# Patient Record
Sex: FEMALE | Race: OTHER | NOT HISPANIC OR LATINO | URBAN - METROPOLITAN AREA
[De-identification: names, ages, dates, MRNs, and addresses within clinical notes are randomized per-mention and may not be internally consistent; named-entity substitution may affect disease eponyms.]

---

## 2017-08-09 ENCOUNTER — OUTPATIENT (OUTPATIENT)
Dept: OUTPATIENT SERVICES | Facility: HOSPITAL | Age: 77
LOS: 1 days | End: 2017-08-09
Payer: MEDICARE

## 2017-08-09 VITALS
HEIGHT: 62 IN | WEIGHT: 98.11 LBS | SYSTOLIC BLOOD PRESSURE: 161 MMHG | HEART RATE: 67 BPM | TEMPERATURE: 98 F | OXYGEN SATURATION: 100 % | RESPIRATION RATE: 16 BRPM | DIASTOLIC BLOOD PRESSURE: 64 MMHG

## 2017-08-09 DIAGNOSIS — I10 ESSENTIAL (PRIMARY) HYPERTENSION: ICD-10-CM

## 2017-08-09 DIAGNOSIS — E03.9 HYPOTHYROIDISM, UNSPECIFIED: ICD-10-CM

## 2017-08-09 DIAGNOSIS — M17.11 UNILATERAL PRIMARY OSTEOARTHRITIS, RIGHT KNEE: ICD-10-CM

## 2017-08-09 DIAGNOSIS — E46 UNSPECIFIED PROTEIN-CALORIE MALNUTRITION: ICD-10-CM

## 2017-08-09 DIAGNOSIS — Z01.818 ENCOUNTER FOR OTHER PREPROCEDURAL EXAMINATION: ICD-10-CM

## 2017-08-09 PROCEDURE — 86900 BLOOD TYPING SEROLOGIC ABO: CPT

## 2017-08-09 PROCEDURE — 86850 RBC ANTIBODY SCREEN: CPT

## 2017-08-09 PROCEDURE — 87641 MR-STAPH DNA AMP PROBE: CPT

## 2017-08-09 PROCEDURE — 86901 BLOOD TYPING SEROLOGIC RH(D): CPT

## 2017-08-09 PROCEDURE — G0463: CPT

## 2017-08-09 PROCEDURE — 87640 STAPH A DNA AMP PROBE: CPT

## 2017-08-09 NOTE — H&P PST ADULT - ATTENDING COMMENTS
Right knee severe tricompartmental arthritis, failed nonsurgical management for a long time.  She has severe pain and disability from knee arthritis and comes today for right total knee replacement.  We went over the surgery, goals, implants, approaches, risks, benefits and alternatives again today and she agrees to proceed. All of their many questions were answered.

## 2017-08-09 NOTE — H&P PST ADULT - NEGATIVE CARDIOVASCULAR SYMPTOMS
no paroxysmal nocturnal dyspnea/no chest pain/no dyspnea on exertion/no peripheral edema/no palpitations/no claudication

## 2017-08-09 NOTE — H&P PST ADULT - CONSTITUTIONAL DETAILS
well-nourished/well-groomed/no distress/well-developed well-groomed/undernourished, BMI 17.9/well-developed/no distress

## 2017-08-09 NOTE — H&P PST ADULT - PROBLEM SELECTOR PLAN 2
Still grieving loss of sole support 3mths ago. Case discussed with Supervisor of  Social Work. [facility] dept will contact patient by phone tomorrow morning

## 2017-08-09 NOTE — H&P PST ADULT - PROBLEM SELECTOR PLAN 3
Instructed to take antihypertensive medication the morning of surgery with a sip of water and to follow with PCP for management of hypertension and other comorbid conditions

## 2017-08-09 NOTE — H&P PST ADULT - HISTORY OF PRESENT ILLNESS
77 y/o female with PMH of      is here today for presurgical evaluation prior to surgery. She complains of severe right knee pain with ambulation and is scheduled for right total knee replacement on 8/23/2017 75 y/o female with PMH of hypertension, hypothyroidism, osteoporosis, hyperlipidemia, and osteoarthritis is here today for presurgical evaluation prior to surgery. She complains of severe right knee pain with ambulation and is scheduled for right total knee replacement on 8/23/2017

## 2017-08-09 NOTE — H&P PST ADULT - PROBLEM SELECTOR PLAN 1
Scheduled for right total knee replacement on 8/23/2017. Preoperative instructions discussed and given to patient and neighbor. Verbalized understanding of same

## 2017-08-09 NOTE — H&P PST ADULT - NEGATIVE GENERAL SYMPTOMS
no weight loss/no weight gain/no polyphagia/no polyuria/no polydipsia/no fatigue/no malaise/no chills/no sweating/no anorexia/no fever

## 2017-08-09 NOTE — H&P PST ADULT - MUSCULOSKELETAL
details… detailed exam decreased ROM due to pain/diminished strength/wearing right knee support sleeve joint swelling/decreased ROM due to pain/wearing right knee support sleeve/diminished strength

## 2017-08-09 NOTE — H&P PST ADULT - ACTIVITY
Limited exercised activity due to severe right knee pain, minimal ADLs, walks 1-2 blocks, avoids stairs

## 2017-08-09 NOTE — H&P PST ADULT - NSANTHOSAYNRD_GEN_A_CORE
No. ALETA screening performed.  STOP BANG Legend: 0-2 = LOW Risk; 3-4 = INTERMEDIATE Risk; 5-8 = HIGH Risk

## 2017-08-09 NOTE — H&P PST ADULT - ASSESSMENT
Pleasant 75 y/o frail-appearing undernourished  female with PMH of hypertension, hypothyroidism, osteoporosis, hyperlipidemia, and primary osteoarthritis of knee is scheduled for right total knee replacement on 8/23/2017. Patient is at  moderate risk for planned procedure

## 2017-08-09 NOTE — H&P PST ADULT - NEGATIVE GASTROINTESTINAL SYMPTOMS
no vomiting/no abdominal pain/no nausea/no diarrhea/no constipation/no change in bowel habits/no flatulence

## 2017-08-09 NOTE — H&P PST ADULT - RS GEN PE MLT RESP DETAILS PC
no rhonchi/no wheezes/good air movement/clear to auscultation bilaterally/airway patent/no chest wall tenderness/no intercostal retractions/no rales/respirations non-labored/no subcutaneous emphysema/normal/breath sounds equal no rhonchi/no rales/clear to auscultation bilaterally/airway patent/no chest wall tenderness/no intercostal retractions/no wheezes/respirations non-labored/no subcutaneous emphysema/good air movement/breath sounds equal

## 2017-08-09 NOTE — H&P PST ADULT - NEGATIVE ENMT SYMPTOMS
no nasal congestion/no vertigo/no sinus symptoms/no tinnitus/no ear pain/no nasal discharge/no nasal obstruction

## 2017-08-09 NOTE — H&P PST ADULT - PMH
Primary osteoarthritis of right knee Essential hypertension    Hyperlipidemia, unspecified hyperlipidemia type    Osteoporosis, unspecified osteoporosis type, unspecified pathological fracture presence    Primary osteoarthritis of right knee

## 2017-08-10 LAB
MRSA PCR RESULT.: SIGNIFICANT CHANGE UP
S AUREUS DNA NOSE QL NAA+PROBE: SIGNIFICANT CHANGE UP

## 2017-08-22 RX ORDER — SODIUM CHLORIDE 9 MG/ML
3 INJECTION INTRAMUSCULAR; INTRAVENOUS; SUBCUTANEOUS EVERY 8 HOURS
Qty: 0 | Refills: 0 | Status: DISCONTINUED | OUTPATIENT
Start: 2017-08-23 | End: 2017-08-26

## 2017-08-22 RX ORDER — ACETAMINOPHEN 500 MG
975 TABLET ORAL ONCE
Qty: 0 | Refills: 0 | Status: COMPLETED | OUTPATIENT
Start: 2017-08-23 | End: 2017-08-23

## 2017-08-22 RX ORDER — CELECOXIB 200 MG/1
200 CAPSULE ORAL ONCE
Qty: 0 | Refills: 0 | Status: COMPLETED | OUTPATIENT
Start: 2017-08-23 | End: 2017-08-23

## 2017-08-23 ENCOUNTER — INPATIENT (INPATIENT)
Facility: HOSPITAL | Age: 77
LOS: 2 days | Discharge: NURSING, REHAB OR LONG-TERM CARE | DRG: 469 | End: 2017-08-26
Attending: ORTHOPAEDIC SURGERY | Admitting: ORTHOPAEDIC SURGERY
Payer: MEDICARE

## 2017-08-23 VITALS
TEMPERATURE: 98 F | HEART RATE: 67 BPM | HEIGHT: 62 IN | SYSTOLIC BLOOD PRESSURE: 158 MMHG | OXYGEN SATURATION: 100 % | RESPIRATION RATE: 14 BRPM | WEIGHT: 98.11 LBS | DIASTOLIC BLOOD PRESSURE: 49 MMHG

## 2017-08-23 DIAGNOSIS — M17.11 UNILATERAL PRIMARY OSTEOARTHRITIS, RIGHT KNEE: ICD-10-CM

## 2017-08-23 LAB
ANION GAP SERPL CALC-SCNC: 8 MMOL/L — SIGNIFICANT CHANGE UP (ref 5–17)
BUN SERPL-MCNC: 8 MG/DL — SIGNIFICANT CHANGE UP (ref 7–18)
CALCIUM SERPL-MCNC: 8.6 MG/DL — SIGNIFICANT CHANGE UP (ref 8.4–10.5)
CHLORIDE SERPL-SCNC: 108 MMOL/L — SIGNIFICANT CHANGE UP (ref 96–108)
CO2 SERPL-SCNC: 25 MMOL/L — SIGNIFICANT CHANGE UP (ref 22–31)
CREAT SERPL-MCNC: 0.58 MG/DL — SIGNIFICANT CHANGE UP (ref 0.5–1.3)
GLUCOSE SERPL-MCNC: 89 MG/DL — SIGNIFICANT CHANGE UP (ref 70–99)
HCT VFR BLD CALC: 32.9 % — LOW (ref 34.5–45)
HGB BLD-MCNC: 11.1 G/DL — LOW (ref 11.5–15.5)
MCHC RBC-ENTMCNC: 30.5 PG — SIGNIFICANT CHANGE UP (ref 27–34)
MCHC RBC-ENTMCNC: 33.7 GM/DL — SIGNIFICANT CHANGE UP (ref 32–36)
MCV RBC AUTO: 90.6 FL — SIGNIFICANT CHANGE UP (ref 80–100)
PLATELET # BLD AUTO: 244 K/UL — SIGNIFICANT CHANGE UP (ref 150–400)
POTASSIUM SERPL-MCNC: 3.8 MMOL/L — SIGNIFICANT CHANGE UP (ref 3.5–5.3)
POTASSIUM SERPL-SCNC: 3.8 MMOL/L — SIGNIFICANT CHANGE UP (ref 3.5–5.3)
RBC # BLD: 3.63 M/UL — LOW (ref 3.8–5.2)
RBC # FLD: 12.3 % — SIGNIFICANT CHANGE UP (ref 10.3–14.5)
SODIUM SERPL-SCNC: 141 MMOL/L — SIGNIFICANT CHANGE UP (ref 135–145)
WBC # BLD: 4.8 K/UL — SIGNIFICANT CHANGE UP (ref 3.8–10.5)
WBC # FLD AUTO: 4.8 K/UL — SIGNIFICANT CHANGE UP (ref 3.8–10.5)

## 2017-08-23 PROCEDURE — 73562 X-RAY EXAM OF KNEE 3: CPT | Mod: 26,RT

## 2017-08-23 RX ORDER — NALOXONE HYDROCHLORIDE 4 MG/.1ML
0.1 SPRAY NASAL
Qty: 0 | Refills: 0 | Status: DISCONTINUED | OUTPATIENT
Start: 2017-08-23 | End: 2017-08-26

## 2017-08-23 RX ORDER — CEFAZOLIN SODIUM 1 G
1000 VIAL (EA) INJECTION EVERY 8 HOURS
Qty: 0 | Refills: 0 | Status: COMPLETED | OUTPATIENT
Start: 2017-08-23 | End: 2017-08-23

## 2017-08-23 RX ORDER — ATORVASTATIN CALCIUM 80 MG/1
40 TABLET, FILM COATED ORAL AT BEDTIME
Qty: 0 | Refills: 0 | Status: DISCONTINUED | OUTPATIENT
Start: 2017-08-23 | End: 2017-08-26

## 2017-08-23 RX ORDER — FERROUS SULFATE 325(65) MG
325 TABLET ORAL
Qty: 0 | Refills: 0 | Status: DISCONTINUED | OUTPATIENT
Start: 2017-08-23 | End: 2017-08-26

## 2017-08-23 RX ORDER — FOLIC ACID 0.8 MG
1 TABLET ORAL DAILY
Qty: 0 | Refills: 0 | Status: DISCONTINUED | OUTPATIENT
Start: 2017-08-23 | End: 2017-08-26

## 2017-08-23 RX ORDER — DOCUSATE SODIUM 100 MG
100 CAPSULE ORAL
Qty: 0 | Refills: 0 | Status: DISCONTINUED | OUTPATIENT
Start: 2017-08-23 | End: 2017-08-23

## 2017-08-23 RX ORDER — SODIUM CHLORIDE 9 MG/ML
1000 INJECTION, SOLUTION INTRAVENOUS
Qty: 0 | Refills: 0 | Status: DISCONTINUED | OUTPATIENT
Start: 2017-08-23 | End: 2017-08-26

## 2017-08-23 RX ORDER — AMLODIPINE BESYLATE 2.5 MG/1
2.5 TABLET ORAL DAILY
Qty: 0 | Refills: 0 | Status: DISCONTINUED | OUTPATIENT
Start: 2017-08-23 | End: 2017-08-26

## 2017-08-23 RX ORDER — ENOXAPARIN SODIUM 100 MG/ML
30 INJECTION SUBCUTANEOUS EVERY 12 HOURS
Qty: 0 | Refills: 0 | Status: DISCONTINUED | OUTPATIENT
Start: 2017-08-23 | End: 2017-08-26

## 2017-08-23 RX ORDER — BUPIVACAINE 13.3 MG/ML
20 INJECTION, SUSPENSION, LIPOSOMAL INFILTRATION ONCE
Qty: 0 | Refills: 0 | Status: DISCONTINUED | OUTPATIENT
Start: 2017-08-23 | End: 2017-08-26

## 2017-08-23 RX ORDER — LEVOTHYROXINE SODIUM 125 MCG
50 TABLET ORAL DAILY
Qty: 0 | Refills: 0 | Status: DISCONTINUED | OUTPATIENT
Start: 2017-08-23 | End: 2017-08-26

## 2017-08-23 RX ORDER — ASPIRIN/CALCIUM CARB/MAGNESIUM 324 MG
81 TABLET ORAL DAILY
Qty: 0 | Refills: 0 | Status: DISCONTINUED | OUTPATIENT
Start: 2017-08-23 | End: 2017-08-26

## 2017-08-23 RX ORDER — DOCUSATE SODIUM 100 MG
100 CAPSULE ORAL THREE TIMES A DAY
Qty: 0 | Refills: 0 | Status: DISCONTINUED | OUTPATIENT
Start: 2017-08-23 | End: 2017-08-26

## 2017-08-23 RX ORDER — TRANEXAMIC ACID 100 MG/ML
1000 INJECTION, SOLUTION INTRAVENOUS
Qty: 0 | Refills: 0 | Status: DISCONTINUED | OUTPATIENT
Start: 2017-08-23 | End: 2017-08-23

## 2017-08-23 RX ORDER — TRANEXAMIC ACID 100 MG/ML
INJECTION, SOLUTION INTRAVENOUS
Qty: 0 | Refills: 0 | Status: DISCONTINUED | OUTPATIENT
Start: 2017-08-23 | End: 2017-08-23

## 2017-08-23 RX ORDER — ACETAMINOPHEN 500 MG
1000 TABLET ORAL ONCE
Qty: 0 | Refills: 0 | Status: DISCONTINUED | OUTPATIENT
Start: 2017-08-23 | End: 2017-08-23

## 2017-08-23 RX ORDER — ACETAMINOPHEN 500 MG
650 TABLET ORAL EVERY 6 HOURS
Qty: 0 | Refills: 0 | Status: DISCONTINUED | OUTPATIENT
Start: 2017-08-23 | End: 2017-08-26

## 2017-08-23 RX ORDER — SENNA PLUS 8.6 MG/1
2 TABLET ORAL AT BEDTIME
Qty: 0 | Refills: 0 | Status: DISCONTINUED | OUTPATIENT
Start: 2017-08-23 | End: 2017-08-26

## 2017-08-23 RX ORDER — ONDANSETRON 8 MG/1
4 TABLET, FILM COATED ORAL EVERY 6 HOURS
Qty: 0 | Refills: 0 | Status: DISCONTINUED | OUTPATIENT
Start: 2017-08-23 | End: 2017-08-26

## 2017-08-23 RX ORDER — HYDROMORPHONE HYDROCHLORIDE 2 MG/ML
0.5 INJECTION INTRAMUSCULAR; INTRAVENOUS; SUBCUTANEOUS ONCE
Qty: 0 | Refills: 0 | Status: DISCONTINUED | OUTPATIENT
Start: 2017-08-23 | End: 2017-08-23

## 2017-08-23 RX ORDER — TRANEXAMIC ACID 100 MG/ML
1000 INJECTION, SOLUTION INTRAVENOUS ONCE
Qty: 0 | Refills: 0 | Status: COMPLETED | OUTPATIENT
Start: 2017-08-23 | End: 2017-08-23

## 2017-08-23 RX ORDER — ASCORBIC ACID 60 MG
500 TABLET,CHEWABLE ORAL
Qty: 0 | Refills: 0 | Status: DISCONTINUED | OUTPATIENT
Start: 2017-08-23 | End: 2017-08-26

## 2017-08-23 RX ORDER — KETOROLAC TROMETHAMINE 30 MG/ML
15 SYRINGE (ML) INJECTION EVERY 8 HOURS
Qty: 0 | Refills: 0 | Status: DISCONTINUED | OUTPATIENT
Start: 2017-08-23 | End: 2017-08-26

## 2017-08-23 RX ORDER — TRANEXAMIC ACID 100 MG/ML
1000 INJECTION, SOLUTION INTRAVENOUS ONCE
Qty: 0 | Refills: 0 | Status: DISCONTINUED | OUTPATIENT
Start: 2017-08-23 | End: 2017-08-23

## 2017-08-23 RX ADMIN — Medication 100 MILLIGRAM(S): at 16:19

## 2017-08-23 RX ADMIN — SODIUM CHLORIDE 3 MILLILITER(S): 9 INJECTION INTRAMUSCULAR; INTRAVENOUS; SUBCUTANEOUS at 23:04

## 2017-08-23 RX ADMIN — Medication 100 MILLIGRAM(S): at 23:05

## 2017-08-23 RX ADMIN — TRANEXAMIC ACID 220 MILLIGRAM(S): 100 INJECTION, SOLUTION INTRAVENOUS at 11:06

## 2017-08-23 RX ADMIN — Medication 975 MILLIGRAM(S): at 06:40

## 2017-08-23 RX ADMIN — Medication 15 MILLIGRAM(S): at 16:19

## 2017-08-23 RX ADMIN — SODIUM CHLORIDE 3 MILLILITER(S): 9 INJECTION INTRAMUSCULAR; INTRAVENOUS; SUBCUTANEOUS at 06:50

## 2017-08-23 RX ADMIN — ENOXAPARIN SODIUM 30 MILLIGRAM(S): 100 INJECTION SUBCUTANEOUS at 23:05

## 2017-08-23 RX ADMIN — Medication 81 MILLIGRAM(S): at 16:20

## 2017-08-23 RX ADMIN — CELECOXIB 200 MILLIGRAM(S): 200 CAPSULE ORAL at 06:41

## 2017-08-23 RX ADMIN — ATORVASTATIN CALCIUM 40 MILLIGRAM(S): 80 TABLET, FILM COATED ORAL at 23:05

## 2017-08-23 RX ADMIN — Medication 15 MILLIGRAM(S): at 17:14

## 2017-08-23 RX ADMIN — Medication 650 MILLIGRAM(S): at 15:11

## 2017-08-23 RX ADMIN — Medication 325 MILLIGRAM(S): at 16:19

## 2017-08-23 RX ADMIN — Medication 500 MILLIGRAM(S): at 16:19

## 2017-08-23 NOTE — PHYSICAL THERAPY INITIAL EVALUATION ADULT - CRITERIA FOR SKILLED THERAPEUTIC INTERVENTIONS
predicted duration of therapy intervention/anticipated equipment needs at discharge/impairments found/rehab potential/anticipated discharge recommendation/therapy frequency

## 2017-08-23 NOTE — PROGRESS NOTE ADULT - SUBJECTIVE AND OBJECTIVE BOX
Ortho Note POD# 0  76yFemale    Diagnosis:  S/p Right Total Knee Replacement POD# 0    Patient is seen and evaluated at bedside; offers no acute complaints. Pain is mild 2/10; well controlled.  Has been seen by PT today for evaluation, and CPM machine. patient has voided post-op.    Vital Signs Last 24 Hrs  T(C): 36.4 (23 Aug 2017 12:51), Max: 36.9 (23 Aug 2017 06:34)  T(F): 97.6 (23 Aug 2017 12:51), Max: 98.4 (23 Aug 2017 06:34)  HR: 58 (23 Aug 2017 17:42) (54 - 69)  BP: 139/64 (23 Aug 2017 17:42) (102/61 - 170/67)  BP(mean): 93 (23 Aug 2017 12:30) (93 - 93)  RR: 17 (23 Aug 2017 12:51) (13 - 20)  SpO2: 99% (23 Aug 2017 17:42) (96% - 100%)  I&O's Detail    23 Aug 2017 07:01  -  23 Aug 2017 19:11  --------------------------------------------------------  IN:    dextrose 5% + sodium chloride 0.45%.: 150 mL    IV PiggyBack: 100 mL  Total IN: 250 mL            Physical Exam:    General: AAOx3, in NAD, resting comfortably in bed.    Right knee:  In nl. alignment. Dressing is C/D/I.  Calves are soft, non-tender. 2+pulses. NVI.                          11.1   4.8   )-----------( 244      ( 23 Aug 2017 10:14 )             32.9     08-23    141  |  108  |  8   ----------------------------<  89  3.8   |  25  |  0.58    Ca    8.6      23 Aug 2017 10:14        Impression:  76yFemale S/p Right total knee replacement POD# 0  Plan:  - continue IV fluids  -  Continue pain management with Toradol 15mg IV PRN Q8hrs for breakthrough pain  -  DVT prophylaxis with Lovenox 30mg SQ q12hrs and venodyne boots  -  Daily Physical Therapy:  WBAT on RLE with walker. CPM requested by Dr. Barron due to patients history of lack of knee flexion.  -  Discharge planning: Home Vs. Rehab pending Physical therapy eval. and patients progress.  -  Continue Antibiotics x 24hrs post-op (kefzol 1gm q8hrs [id number])  -  Encouraged use of incentive spirometer  -  Case d/w Dr. Barron

## 2017-08-23 NOTE — PHYSICAL THERAPY INITIAL EVALUATION ADULT - ACTIVE RANGE OF MOTION EXAMINATION, REHAB EVAL
bilateral upper extremity Active ROM was WFL (within functional limits)/Right ankle-WFL/Left LE Active ROM was WFL (within functional limits)

## 2017-08-23 NOTE — CHART NOTE - NSCHARTNOTEFT_GEN_A_CORE
BRIEF OPERATIVE NOTE:  DATE 8/23/17  DIAGNOSIS:  RIGHT KNEE SEVERE OSTEOARTHRITIS  ICD-10 - M17.11    PROCEDURE:  1. RIGHT COMPLEX TOTAL KNEE REPLACEMENT AND 2. COMPUTER-ASSISTED IMAGELESS NAVIGATION  CPT 26906-IF-66 AND 89372-TG  SURGEON: CLAUDETTE LAJAM, MD (ORTHOPEDIC SURGERY)  ASSISTANT:  SYLWIA EDDY PA-C  ANESTHESIA:  DR. VALVERDE/ SPINAL WITH INTRA-ARTICULAR MEDICATION  TRANEXAMIC ACID WAS GIVEN    TOURNIQUET TIME:  51 MINUTES  MM HG  IMPLANTS:  GONZALEZ AND NEPHEW LEGION  FEMUR SIZE 3, TIBIA SIZE 2, 11MM HIGH FLEX POLY, 29 MM PATELLA  ORTHALIGN NAVIGATION WAS USED    BLOOD LOSS:  APPROXIMATELY 150 CC  COMPLICATIONS: NONE KNOWN

## 2017-08-23 NOTE — PATIENT PROFILE ADULT. - PMH
Essential hypertension    Hyperlipidemia, unspecified hyperlipidemia type    Osteoporosis, unspecified osteoporosis type, unspecified pathological fracture presence    Primary osteoarthritis of right knee

## 2017-08-24 LAB
ANION GAP SERPL CALC-SCNC: 6 MMOL/L — SIGNIFICANT CHANGE UP (ref 5–17)
BUN SERPL-MCNC: 7 MG/DL — SIGNIFICANT CHANGE UP (ref 7–18)
CALCIUM SERPL-MCNC: 8.4 MG/DL — SIGNIFICANT CHANGE UP (ref 8.4–10.5)
CHLORIDE SERPL-SCNC: 102 MMOL/L — SIGNIFICANT CHANGE UP (ref 96–108)
CO2 SERPL-SCNC: 28 MMOL/L — SIGNIFICANT CHANGE UP (ref 22–31)
CREAT SERPL-MCNC: 0.57 MG/DL — SIGNIFICANT CHANGE UP (ref 0.5–1.3)
GLUCOSE SERPL-MCNC: 136 MG/DL — HIGH (ref 70–99)
HCT VFR BLD CALC: 33.3 % — LOW (ref 34.5–45)
HGB BLD-MCNC: 11 G/DL — LOW (ref 11.5–15.5)
MCHC RBC-ENTMCNC: 30 PG — SIGNIFICANT CHANGE UP (ref 27–34)
MCHC RBC-ENTMCNC: 33.2 GM/DL — SIGNIFICANT CHANGE UP (ref 32–36)
MCV RBC AUTO: 90.3 FL — SIGNIFICANT CHANGE UP (ref 80–100)
PLATELET # BLD AUTO: 278 K/UL — SIGNIFICANT CHANGE UP (ref 150–400)
POTASSIUM SERPL-MCNC: 3.4 MMOL/L — LOW (ref 3.5–5.3)
POTASSIUM SERPL-SCNC: 3.4 MMOL/L — LOW (ref 3.5–5.3)
RBC # BLD: 3.69 M/UL — LOW (ref 3.8–5.2)
RBC # FLD: 12 % — SIGNIFICANT CHANGE UP (ref 10.3–14.5)
SODIUM SERPL-SCNC: 136 MMOL/L — SIGNIFICANT CHANGE UP (ref 135–145)
WBC # BLD: 7.2 K/UL — SIGNIFICANT CHANGE UP (ref 3.8–10.5)
WBC # FLD AUTO: 7.2 K/UL — SIGNIFICANT CHANGE UP (ref 3.8–10.5)

## 2017-08-24 RX ORDER — OXYCODONE AND ACETAMINOPHEN 5; 325 MG/1; MG/1
1 TABLET ORAL EVERY 4 HOURS
Qty: 0 | Refills: 0 | Status: DISCONTINUED | OUTPATIENT
Start: 2017-08-24 | End: 2017-08-26

## 2017-08-24 RX ORDER — OXYCODONE AND ACETAMINOPHEN 5; 325 MG/1; MG/1
2 TABLET ORAL EVERY 6 HOURS
Qty: 0 | Refills: 0 | Status: DISCONTINUED | OUTPATIENT
Start: 2017-08-24 | End: 2017-08-26

## 2017-08-24 RX ORDER — POTASSIUM CHLORIDE 20 MEQ
40 PACKET (EA) ORAL ONCE
Qty: 0 | Refills: 0 | Status: COMPLETED | OUTPATIENT
Start: 2017-08-24 | End: 2017-08-24

## 2017-08-24 RX ADMIN — Medication 81 MILLIGRAM(S): at 12:20

## 2017-08-24 RX ADMIN — Medication 325 MILLIGRAM(S): at 12:19

## 2017-08-24 RX ADMIN — Medication 100 MILLIGRAM(S): at 21:25

## 2017-08-24 RX ADMIN — ATORVASTATIN CALCIUM 40 MILLIGRAM(S): 80 TABLET, FILM COATED ORAL at 21:25

## 2017-08-24 RX ADMIN — Medication 50 MICROGRAM(S): at 05:37

## 2017-08-24 RX ADMIN — OXYCODONE AND ACETAMINOPHEN 2 TABLET(S): 5; 325 TABLET ORAL at 21:26

## 2017-08-24 RX ADMIN — Medication 40 MILLIEQUIVALENT(S): at 12:19

## 2017-08-24 RX ADMIN — Medication 500 MILLIGRAM(S): at 05:37

## 2017-08-24 RX ADMIN — Medication 325 MILLIGRAM(S): at 07:45

## 2017-08-24 RX ADMIN — Medication 15 MILLIGRAM(S): at 05:37

## 2017-08-24 RX ADMIN — SODIUM CHLORIDE 3 MILLILITER(S): 9 INJECTION INTRAMUSCULAR; INTRAVENOUS; SUBCUTANEOUS at 12:18

## 2017-08-24 RX ADMIN — Medication 325 MILLIGRAM(S): at 18:04

## 2017-08-24 RX ADMIN — ENOXAPARIN SODIUM 30 MILLIGRAM(S): 100 INJECTION SUBCUTANEOUS at 12:19

## 2017-08-24 RX ADMIN — SODIUM CHLORIDE 3 MILLILITER(S): 9 INJECTION INTRAMUSCULAR; INTRAVENOUS; SUBCUTANEOUS at 21:19

## 2017-08-24 RX ADMIN — OXYCODONE AND ACETAMINOPHEN 1 TABLET(S): 5; 325 TABLET ORAL at 11:44

## 2017-08-24 RX ADMIN — Medication 1 TABLET(S): at 12:20

## 2017-08-24 RX ADMIN — SODIUM CHLORIDE 3 MILLILITER(S): 9 INJECTION INTRAMUSCULAR; INTRAVENOUS; SUBCUTANEOUS at 05:44

## 2017-08-24 RX ADMIN — Medication 1 MILLIGRAM(S): at 12:19

## 2017-08-24 RX ADMIN — AMLODIPINE BESYLATE 2.5 MILLIGRAM(S): 2.5 TABLET ORAL at 05:37

## 2017-08-24 RX ADMIN — Medication 500 MILLIGRAM(S): at 18:04

## 2017-08-24 RX ADMIN — ENOXAPARIN SODIUM 30 MILLIGRAM(S): 100 INJECTION SUBCUTANEOUS at 21:25

## 2017-08-24 RX ADMIN — Medication 100 MILLIGRAM(S): at 12:19

## 2017-08-24 RX ADMIN — Medication 15 MILLIGRAM(S): at 06:07

## 2017-08-24 RX ADMIN — OXYCODONE AND ACETAMINOPHEN 2 TABLET(S): 5; 325 TABLET ORAL at 22:33

## 2017-08-24 RX ADMIN — SENNA PLUS 2 TABLET(S): 8.6 TABLET ORAL at 07:45

## 2017-08-24 RX ADMIN — Medication 100 MILLIGRAM(S): at 05:37

## 2017-08-24 RX ADMIN — OXYCODONE AND ACETAMINOPHEN 1 TABLET(S): 5; 325 TABLET ORAL at 08:55

## 2017-08-24 NOTE — PROGRESS NOTE ADULT - SUBJECTIVE AND OBJECTIVE BOX
ATTENDING NOTE - POD #1 RIGHT TKA, COMPLEX    Patient awake, in bed.  Complains of knee pain. Otherwise feels "good."  She has been on CPM and states it felt OK.  Denies CP or SOB. + flatus.  Tolerating PO.  AFVSS    Right knee: Dressings CDI  EHL TA GS 5/5  SILT  Calves supple  pulses 2+  Abdomen soft and nontender    Labs - P    A/P  Right TKA yesterday.  Doing well  Complex knee, was immoble preoperatively with ROM 0-10 - must work on ROM  OOB, WBAT  DVT ppx  DC plan to [facility] Friday.   FU office in 3 weeks [phone] ATTENDING NOTE - POD #1 RIGHT TKA, COMPLEX    Patient awake, in bed.  Complains of knee pain. Otherwise feels "good."  She has been on CPM and states it felt OK.  Denies CP or SOB. + flatus.  Tolerating PO.  AFVSS    Right knee: Dressings CDI  EHL TA GS 5/5  SILT  Calves supple  pulses 2+  Abdomen soft and nontender                          11.0   x     )-----------( 278      ( 24 Aug 2017 07:46 )             33.3   08-24    136  |  102  |  7   ----------------------------<  136<H>  3.4<L>   |  28  |  0.57    Ca    8.4      24 Aug 2017 07:46        A/P  Right TKA yesterday.  Doing well  Complex knee, was immoble preoperatively with ROM 0-10 - must work on ROM  OOB, WBAT  DVT ppx  DC plan to [facility] Friday.   FU office in 3 weeks [phone]

## 2017-08-25 LAB
ANION GAP SERPL CALC-SCNC: 7 MMOL/L — SIGNIFICANT CHANGE UP (ref 5–17)
BUN SERPL-MCNC: 8 MG/DL — SIGNIFICANT CHANGE UP (ref 7–18)
CALCIUM SERPL-MCNC: 8.8 MG/DL — SIGNIFICANT CHANGE UP (ref 8.4–10.5)
CHLORIDE SERPL-SCNC: 102 MMOL/L — SIGNIFICANT CHANGE UP (ref 96–108)
CO2 SERPL-SCNC: 28 MMOL/L — SIGNIFICANT CHANGE UP (ref 22–31)
CREAT SERPL-MCNC: 0.62 MG/DL — SIGNIFICANT CHANGE UP (ref 0.5–1.3)
GLUCOSE SERPL-MCNC: 100 MG/DL — HIGH (ref 70–99)
HCT VFR BLD CALC: 34.7 % — SIGNIFICANT CHANGE UP (ref 34.5–45)
HGB BLD-MCNC: 11.5 G/DL — SIGNIFICANT CHANGE UP (ref 11.5–15.5)
MCHC RBC-ENTMCNC: 30 PG — SIGNIFICANT CHANGE UP (ref 27–34)
MCHC RBC-ENTMCNC: 33.1 GM/DL — SIGNIFICANT CHANGE UP (ref 32–36)
MCV RBC AUTO: 90.6 FL — SIGNIFICANT CHANGE UP (ref 80–100)
PLATELET # BLD AUTO: 277 K/UL — SIGNIFICANT CHANGE UP (ref 150–400)
POTASSIUM SERPL-MCNC: 3.8 MMOL/L — SIGNIFICANT CHANGE UP (ref 3.5–5.3)
POTASSIUM SERPL-SCNC: 3.8 MMOL/L — SIGNIFICANT CHANGE UP (ref 3.5–5.3)
RBC # BLD: 3.83 M/UL — SIGNIFICANT CHANGE UP (ref 3.8–5.2)
RBC # FLD: 11.9 % — SIGNIFICANT CHANGE UP (ref 10.3–14.5)
SODIUM SERPL-SCNC: 137 MMOL/L — SIGNIFICANT CHANGE UP (ref 135–145)
WBC # BLD: 8.4 K/UL — SIGNIFICANT CHANGE UP (ref 3.8–10.5)
WBC # FLD AUTO: 8.4 K/UL — SIGNIFICANT CHANGE UP (ref 3.8–10.5)

## 2017-08-25 RX ORDER — ACETAMINOPHEN 500 MG
1 TABLET ORAL
Qty: 0 | Refills: 0 | COMMUNITY

## 2017-08-25 RX ORDER — FOLIC ACID 0.8 MG
1 TABLET ORAL
Qty: 0 | Refills: 0 | DISCHARGE
Start: 2017-08-25

## 2017-08-25 RX ORDER — ASCORBIC ACID 60 MG
1 TABLET,CHEWABLE ORAL
Qty: 0 | Refills: 0 | DISCHARGE
Start: 2017-08-25

## 2017-08-25 RX ORDER — ENOXAPARIN SODIUM 100 MG/ML
30 INJECTION SUBCUTANEOUS
Qty: 0 | Refills: 0 | DISCHARGE
Start: 2017-08-25

## 2017-08-25 RX ADMIN — Medication 100 MILLIGRAM(S): at 05:09

## 2017-08-25 RX ADMIN — OXYCODONE AND ACETAMINOPHEN 2 TABLET(S): 5; 325 TABLET ORAL at 11:00

## 2017-08-25 RX ADMIN — Medication 1 TABLET(S): at 11:28

## 2017-08-25 RX ADMIN — SENNA PLUS 2 TABLET(S): 8.6 TABLET ORAL at 05:10

## 2017-08-25 RX ADMIN — ENOXAPARIN SODIUM 30 MILLIGRAM(S): 100 INJECTION SUBCUTANEOUS at 21:10

## 2017-08-25 RX ADMIN — ENOXAPARIN SODIUM 30 MILLIGRAM(S): 100 INJECTION SUBCUTANEOUS at 11:28

## 2017-08-25 RX ADMIN — Medication 325 MILLIGRAM(S): at 17:53

## 2017-08-25 RX ADMIN — SODIUM CHLORIDE 3 MILLILITER(S): 9 INJECTION INTRAMUSCULAR; INTRAVENOUS; SUBCUTANEOUS at 17:47

## 2017-08-25 RX ADMIN — OXYCODONE AND ACETAMINOPHEN 2 TABLET(S): 5; 325 TABLET ORAL at 21:13

## 2017-08-25 RX ADMIN — SODIUM CHLORIDE 3 MILLILITER(S): 9 INJECTION INTRAMUSCULAR; INTRAVENOUS; SUBCUTANEOUS at 22:00

## 2017-08-25 RX ADMIN — Medication 500 MILLIGRAM(S): at 05:09

## 2017-08-25 RX ADMIN — Medication 81 MILLIGRAM(S): at 11:28

## 2017-08-25 RX ADMIN — Medication 1 MILLIGRAM(S): at 11:28

## 2017-08-25 RX ADMIN — OXYCODONE AND ACETAMINOPHEN 2 TABLET(S): 5; 325 TABLET ORAL at 10:10

## 2017-08-25 RX ADMIN — SODIUM CHLORIDE 3 MILLILITER(S): 9 INJECTION INTRAMUSCULAR; INTRAVENOUS; SUBCUTANEOUS at 05:13

## 2017-08-25 RX ADMIN — Medication 325 MILLIGRAM(S): at 08:27

## 2017-08-25 RX ADMIN — Medication 100 MILLIGRAM(S): at 13:42

## 2017-08-25 RX ADMIN — ATORVASTATIN CALCIUM 40 MILLIGRAM(S): 80 TABLET, FILM COATED ORAL at 21:10

## 2017-08-25 RX ADMIN — OXYCODONE AND ACETAMINOPHEN 1 TABLET(S): 5; 325 TABLET ORAL at 06:42

## 2017-08-25 RX ADMIN — Medication 500 MILLIGRAM(S): at 17:53

## 2017-08-25 RX ADMIN — OXYCODONE AND ACETAMINOPHEN 2 TABLET(S): 5; 325 TABLET ORAL at 21:45

## 2017-08-25 RX ADMIN — Medication 100 MILLIGRAM(S): at 21:10

## 2017-08-25 RX ADMIN — OXYCODONE AND ACETAMINOPHEN 1 TABLET(S): 5; 325 TABLET ORAL at 05:15

## 2017-08-25 RX ADMIN — Medication 325 MILLIGRAM(S): at 13:42

## 2017-08-25 RX ADMIN — Medication 50 MICROGRAM(S): at 05:09

## 2017-08-25 RX ADMIN — AMLODIPINE BESYLATE 2.5 MILLIGRAM(S): 2.5 TABLET ORAL at 05:40

## 2017-08-25 NOTE — PROGRESS NOTE ADULT - SUBJECTIVE AND OBJECTIVE BOX
76yFemale    Diagnosis:  S/p R Total Knee Replacement POD#2    Patient was seen and evaluated at bedside. Patient with no acute complaints.   Pain is well controlled.  Denies CP/SOB, dyspnea, paresthesias, N/V/D, palpitations.     Vital Signs Last 24 Hrs  T(C): 36.4 (25 Aug 2017 05:10), Max: 36.6 (24 Aug 2017 21:15)  T(F): 97.6 (25 Aug 2017 05:10), Max: 97.8 (24 Aug 2017 21:15)  HR: 69 (25 Aug 2017 05:10) (65 - 72)  BP: 151/52 (25 Aug 2017 05:10) (132/58 - 151/52)  BP(mean): --  RR: 17 (25 Aug 2017 05:10) (17 - 18)  SpO2: 100% (25 Aug 2017 05:10) (98% - 100%)  I&O's Detail      Physical Exam:    General: AAOx3, NAD, resting comfortably in bed.    R KNEE:  Dressing is C/D/I. Skin is pink and warm. Staples intact. No erythema. SILT.  Wound with no drainage, healing well.   Lower extremity:  No calf tenderness, calves are soft. 2+pulses. NVI. 5/5 Strength of EHL/TA/gastrocnemius B/L.  Good capillary refill.                           11.5   8.4   )-----------( 277      ( 25 Aug 2017 06:25 )             34.7     08-25    137  |  102  |  8   ----------------------------<  100<H>  3.8   |  28  |  0.62    Ca    8.8      25 Aug 2017 06:25        Impression:  76yFemale S/p R Total Knee Replacement POD#2  Plan:  -  Pain management  -  Dvt prophylaxis with Lovenox/venodynes  -  Daily Physical Theapy:  WBAT   -  Continue with heel bump when laying in bed  -  Discharge planning: Home Vs. Rehab pending Physical therapy eval.  -  Dressing changed

## 2017-08-25 NOTE — DISCHARGE NOTE ADULT - CARE PLAN
Principal Discharge DX:	Primary osteoarthritis of right knee  Goal:	Right total knee replacement  Instructions for follow-up, activity and diet:	improve pain and ROM  Secondary Diagnosis:	Essential hypertension  Goal:	continue home meds  Secondary Diagnosis:	Hyperlipidemia, unspecified hyperlipidemia type  Goal:	continue home meds  Secondary Diagnosis:	Hypothyroidism, unspecified type  Goal:	continue home meds

## 2017-08-25 NOTE — DISCHARGE NOTE ADULT - MEDICATION SUMMARY - MEDICATIONS TO TAKE
I will START or STAY ON the medications listed below when I get home from the hospital:    fish oil  -- 1 tab(s) by mouth once a day, As Needed  -- Indication: For as before    aspirin 81 mg oral tablet  -- 1 tab(s) by mouth once a day  -- Indication: For as before    Percocet 5/325 oral tablet  -- 1 tab(s) by mouth every 4 hours, As Needed  -- Indication: For Pain    enoxaparin  -- 30 milligram(s) subcutaneous every 12 hours  -- Indication: For dvt prophylaxis    atorvastatin 40 mg oral tablet  -- 1 tab(s) by mouth once a day  -- Indication: For as before    alendronate weekly  -- 1 tab(s) by mouth once a week  -- Indication: For as before    amLODIPine 2.5 mg oral tablet  -- 1 tab(s) by mouth once a day  -- Indication: For as before    ferrous sulfate 325 mg (65 mg elemental iron) oral tablet  -- 1 tab(s) by mouth 2 times a day  -- Indication: For anemia    Colace 100 mg oral capsule  -- 1 cap(s) by mouth 2 times a day  -- Indication: For constipation    levothyroxine 50 mcg (0.05 mg) oral tablet  -- 1 tab(s) by mouth once a day  -- Indication: For as before    Calcium 600+D oral tablet  -- 1 tab(s) by mouth once a day  -- Indication: For as before    Centrum Silver oral tablet  -- 1 tab(s) by mouth once a day  -- Indication: For as before    ascorbic acid 500 mg oral tablet  -- 1 tab(s) by mouth 2 times a day  -- Indication: For supplement    folic acid 1 mg oral tablet  -- 1 tab(s) by mouth once a day  -- Indication: For supplement

## 2017-08-25 NOTE — DISCHARGE NOTE ADULT - ADDITIONAL INSTRUCTIONS
Pain Management- See Attached Medication Reconciliation    **StretchStrap Stretching exercises for Total knee replacement***  Weight Bearing Status: WBAT of RLE  Equipment needs: Commode, Walker  Incision Site: REMOVE STAPLES on 9/7/17  STAPLES TO BE REMOVED BY NURSE  NURSE TO APPLY STERI-STRIPS TO THE WOUND AFTER STAPLE REMOVAL   Dvt prophylaxis: D/C LOVENOX on 9/7/17  PT/Occupational Therapy are Activities of Daily Living as appropriate  Follow up with Orthopedic Surgeon after STAPLES ARE REMOVED at: [phone]

## 2017-08-25 NOTE — DISCHARGE NOTE ADULT - PATIENT PORTAL LINK FT
“You can access the Follow[employer] Patient Portal, offered by [facility], by registering with the following website: [email or web address]”

## 2017-08-25 NOTE — DISCHARGE NOTE ADULT - CARE PROVIDER_API CALL
Lajam, Claudette M (MD), Orthopaedic Surgery  [address] [place] [address] [address] [place] [id number]  Phone: [phone]  Fax: [phone]

## 2017-08-26 VITALS
SYSTOLIC BLOOD PRESSURE: 109 MMHG | OXYGEN SATURATION: 96 % | RESPIRATION RATE: 17 BRPM | TEMPERATURE: 98 F | HEART RATE: 76 BPM | DIASTOLIC BLOOD PRESSURE: 52 MMHG

## 2017-08-26 DIAGNOSIS — E78.5 HYPERLIPIDEMIA, UNSPECIFIED: ICD-10-CM

## 2017-08-26 LAB
ANION GAP SERPL CALC-SCNC: 9 MMOL/L — SIGNIFICANT CHANGE UP (ref 5–17)
BUN SERPL-MCNC: 14 MG/DL — SIGNIFICANT CHANGE UP (ref 7–18)
CALCIUM SERPL-MCNC: 8.5 MG/DL — SIGNIFICANT CHANGE UP (ref 8.4–10.5)
CHLORIDE SERPL-SCNC: 105 MMOL/L — SIGNIFICANT CHANGE UP (ref 96–108)
CO2 SERPL-SCNC: 25 MMOL/L — SIGNIFICANT CHANGE UP (ref 22–31)
CREAT SERPL-MCNC: 0.56 MG/DL — SIGNIFICANT CHANGE UP (ref 0.5–1.3)
GLUCOSE SERPL-MCNC: 88 MG/DL — SIGNIFICANT CHANGE UP (ref 70–99)
HCT VFR BLD CALC: 27.6 % — LOW (ref 34.5–45)
HGB BLD-MCNC: 9.2 G/DL — LOW (ref 11.5–15.5)
MCHC RBC-ENTMCNC: 29.8 PG — SIGNIFICANT CHANGE UP (ref 27–34)
MCHC RBC-ENTMCNC: 33.3 GM/DL — SIGNIFICANT CHANGE UP (ref 32–36)
MCV RBC AUTO: 89.4 FL — SIGNIFICANT CHANGE UP (ref 80–100)
PLATELET # BLD AUTO: 264 K/UL — SIGNIFICANT CHANGE UP (ref 150–400)
POTASSIUM SERPL-MCNC: 4.2 MMOL/L — SIGNIFICANT CHANGE UP (ref 3.5–5.3)
POTASSIUM SERPL-SCNC: 4.2 MMOL/L — SIGNIFICANT CHANGE UP (ref 3.5–5.3)
RBC # BLD: 3.09 M/UL — LOW (ref 3.8–5.2)
RBC # FLD: 11.7 % — SIGNIFICANT CHANGE UP (ref 10.3–14.5)
SODIUM SERPL-SCNC: 139 MMOL/L — SIGNIFICANT CHANGE UP (ref 135–145)
WBC # BLD: 6.4 K/UL — SIGNIFICANT CHANGE UP (ref 3.8–10.5)
WBC # FLD AUTO: 6.4 K/UL — SIGNIFICANT CHANGE UP (ref 3.8–10.5)

## 2017-08-26 PROCEDURE — 86901 BLOOD TYPING SEROLOGIC RH(D): CPT

## 2017-08-26 PROCEDURE — 85027 COMPLETE CBC AUTOMATED: CPT

## 2017-08-26 PROCEDURE — C1776: CPT

## 2017-08-26 PROCEDURE — 86850 RBC ANTIBODY SCREEN: CPT

## 2017-08-26 PROCEDURE — 73562 X-RAY EXAM OF KNEE 3: CPT

## 2017-08-26 PROCEDURE — 97163 PT EVAL HIGH COMPLEX 45 MIN: CPT

## 2017-08-26 PROCEDURE — 80048 BASIC METABOLIC PNL TOTAL CA: CPT

## 2017-08-26 PROCEDURE — 97116 GAIT TRAINING THERAPY: CPT

## 2017-08-26 PROCEDURE — C1713: CPT

## 2017-08-26 PROCEDURE — 97110 THERAPEUTIC EXERCISES: CPT

## 2017-08-26 RX ADMIN — Medication 100 MILLIGRAM(S): at 12:55

## 2017-08-26 RX ADMIN — OXYCODONE AND ACETAMINOPHEN 1 TABLET(S): 5; 325 TABLET ORAL at 08:14

## 2017-08-26 RX ADMIN — Medication 1 MILLIGRAM(S): at 11:22

## 2017-08-26 RX ADMIN — Medication 500 MILLIGRAM(S): at 05:47

## 2017-08-26 RX ADMIN — AMLODIPINE BESYLATE 2.5 MILLIGRAM(S): 2.5 TABLET ORAL at 05:47

## 2017-08-26 RX ADMIN — Medication 325 MILLIGRAM(S): at 08:14

## 2017-08-26 RX ADMIN — OXYCODONE AND ACETAMINOPHEN 1 TABLET(S): 5; 325 TABLET ORAL at 12:54

## 2017-08-26 RX ADMIN — Medication 1 TABLET(S): at 11:22

## 2017-08-26 RX ADMIN — SODIUM CHLORIDE 3 MILLILITER(S): 9 INJECTION INTRAMUSCULAR; INTRAVENOUS; SUBCUTANEOUS at 16:29

## 2017-08-26 RX ADMIN — Medication 100 MILLIGRAM(S): at 05:47

## 2017-08-26 RX ADMIN — OXYCODONE AND ACETAMINOPHEN 1 TABLET(S): 5; 325 TABLET ORAL at 09:14

## 2017-08-26 RX ADMIN — Medication 50 MICROGRAM(S): at 05:47

## 2017-08-26 RX ADMIN — Medication 81 MILLIGRAM(S): at 11:22

## 2017-08-26 RX ADMIN — ENOXAPARIN SODIUM 30 MILLIGRAM(S): 100 INJECTION SUBCUTANEOUS at 11:21

## 2017-08-26 RX ADMIN — SODIUM CHLORIDE 3 MILLILITER(S): 9 INJECTION INTRAMUSCULAR; INTRAVENOUS; SUBCUTANEOUS at 05:48

## 2017-08-26 RX ADMIN — OXYCODONE AND ACETAMINOPHEN 1 TABLET(S): 5; 325 TABLET ORAL at 13:45

## 2017-08-26 NOTE — CHART NOTE - NSCHARTNOTEFT_GEN_A_CORE
Upon Nutritional Assessment by the Registered Dietitian your patient was determined to meet criteria / has evidence of the following diagnosis/diagnoses:          [ ]  Mild Protein Calorie Malnutrition        [ ]  Moderate Protein Calorie Malnutrition        [ X ] Severe Protein Calorie Malnutrition        [ ] Unspecified Protein Calorie Malnutrition        [ X ] Underweight / BMI <19        [ ] Morbid Obesity / BMI > 40      Findings as based on:  •  Comprehensive nutrition assessment and consultation  •  Calorie counts (nutrient intake analysis)  •  Food acceptance and intake status from observations by staff  •  Follow up  •  Patient education  •  Intervention secondary to interdisciplinary rounds  •   concerns      Treatment:    The following diet has been recommended: Ensure Enlive  1can ( 240ml ) x bid ( 700 kcal, 40 g protein)     PROVIDER Section:     By signing this assessment you are acknowledging and agree with the diagnosis/diagnoses assigned by the Registered Dietitian    Comments:

## 2017-08-26 NOTE — DIETITIAN INITIAL EVALUATION ADULT. - SOURCE
chart review. Pt well-communicated, tearful when mentioned about family loss/other (specify)/patient

## 2017-08-26 NOTE — DIETITIAN INITIAL EVALUATION ADULT. - FACTORS AFF FOOD INTAKE
grieving loss of sister ( sore support) x 3 m ago/[employer]/ethnic/cultural/personal food preferences/difficulty with food procurement/preparation/persistent lack of appetite

## 2017-08-26 NOTE — PROGRESS NOTE ADULT - SUBJECTIVE AND OBJECTIVE BOX
76yFemale    Diagnosis:  S/p R Total Knee Replacement POD# 3    Patient was seen and evaluated at bedside. Patient with no acute complaints.   Pain is  well controlled.   Pt states she is ready to be discharged to rehab today  Denies CP/SOB, dyspnea, paresthesias, N/V/D, palpitations.     Vital Signs Last 24 Hrs  T(C): 36.8 (26 Aug 2017 05:02), Max: 36.8 (25 Aug 2017 14:12)  T(F): 98.3 (26 Aug 2017 05:02), Max: 98.3 (25 Aug 2017 14:12)  HR: 69 (26 Aug 2017 05:02) (62 - 87)  BP: 144/49 (26 Aug 2017 05:02) (140/58 - 155/50)  BP(mean): --  RR: 16 (26 Aug 2017 05:02) (16 - 16)  SpO2: 100% (26 Aug 2017 05:02) (100% - 100%)  I&O's Detail      Physical Exam:    General: AAOx3, NAD, resting comfortably in bed.    R KNEE:  Dressing is C/D/I. Skin is pink and warm. No erythema. SILT.   Lower extremity:  No calf tenderness, calves are soft. 2+pulses. NVI. 5/5 Strength of EHL/TA/gastrocnemius B/L.  Good capillary refill.                           9.2    6.4   )-----------( 264      ( 26 Aug 2017 06:33 )             27.6     08-26    139  |  105  |  14  ----------------------------<  88  4.2   |  25  |  0.56    Ca    8.5      26 Aug 2017 06:33        Impression:  76yFemale S/p R Total Knee Replacement POD# 3  Plan:  -  Pain management  -  Dvt prophylaxis  -  Daily Physical Therapy:  WBAT  to RLE  -  Discharge planning: Rehab today  -  Heel bump to RLE  -  Incentive Spirometer

## 2017-08-26 NOTE — DIETITIAN INITIAL EVALUATION ADULT. - NUTRITION INTERVENTION
Feeding Assistance/Collaboration and Referral of Nutrition Care/Meals and Snack/Medical Food Supplements

## 2017-08-26 NOTE — DIETITIAN INITIAL EVALUATION ADULT. - OTHER INFO
Nutrition assessment for low BMI; lives home alone PTA; no pressure ulcer noted; s/p total knee replacement 8/23/17, for rehab placement; denied GI distress, chewing or swallowing problem at present, food choices obtained, willing to try liquid nutrition supplement; Malnourished pt per H&P

## 2017-08-26 NOTE — DIETITIAN INITIAL EVALUATION ADULT. - NS AS NUTRI INTERV COLLABORAT
Collaboration with other providers/Discussed with RN; Informed orthopedic PA Collaboration with other providers/Discussed with RN

## 2017-08-30 DIAGNOSIS — E03.9 HYPOTHYROIDISM, UNSPECIFIED: ICD-10-CM

## 2017-08-30 DIAGNOSIS — M17.11 UNILATERAL PRIMARY OSTEOARTHRITIS, RIGHT KNEE: ICD-10-CM

## 2017-08-30 DIAGNOSIS — I10 ESSENTIAL (PRIMARY) HYPERTENSION: ICD-10-CM

## 2017-08-30 DIAGNOSIS — M81.0 AGE-RELATED OSTEOPOROSIS WITHOUT CURRENT PATHOLOGICAL FRACTURE: ICD-10-CM

## 2017-08-30 DIAGNOSIS — M24.661 ANKYLOSIS, RIGHT KNEE: ICD-10-CM

## 2017-08-30 DIAGNOSIS — Z79.82 LONG TERM (CURRENT) USE OF ASPIRIN: ICD-10-CM

## 2017-08-30 DIAGNOSIS — E43 UNSPECIFIED SEVERE PROTEIN-CALORIE MALNUTRITION: ICD-10-CM

## 2017-08-30 DIAGNOSIS — E78.5 HYPERLIPIDEMIA, UNSPECIFIED: ICD-10-CM

## 2019-03-29 NOTE — DISCHARGE NOTE ADULT - VISION (WITH CORRECTIVE LENSES IF THE PATIENT USUALLY WEARS THEM):
Partially impaired: cannot see medication labels or newsprint, but can see obstacles in path, and the surrounding layout; can count fingers at arm's length
<-- Click to add NO significant Past Surgical History

## 2022-05-11 ENCOUNTER — INPATIENT (INPATIENT)
Facility: HOSPITAL | Age: 82
LOS: 5 days | Discharge: NURSING, REHAB OR LONG-TERM CARE | DRG: 552 | End: 2022-05-17
Attending: STUDENT IN AN ORGANIZED HEALTH CARE EDUCATION/TRAINING PROGRAM | Admitting: STUDENT IN AN ORGANIZED HEALTH CARE EDUCATION/TRAINING PROGRAM
Payer: MEDICARE

## 2022-05-11 VITALS
OXYGEN SATURATION: 96 % | WEIGHT: 134.92 LBS | DIASTOLIC BLOOD PRESSURE: 89 MMHG | SYSTOLIC BLOOD PRESSURE: 164 MMHG | TEMPERATURE: 98 F | HEIGHT: 62 IN | HEART RATE: 78 BPM | RESPIRATION RATE: 16 BRPM

## 2022-05-11 DIAGNOSIS — M54.9 DORSALGIA, UNSPECIFIED: ICD-10-CM

## 2022-05-11 DIAGNOSIS — M54.50 LOW BACK PAIN, UNSPECIFIED: ICD-10-CM

## 2022-05-11 DIAGNOSIS — R26.2 DIFFICULTY IN WALKING, NOT ELSEWHERE CLASSIFIED: ICD-10-CM

## 2022-05-11 DIAGNOSIS — I10 ESSENTIAL (PRIMARY) HYPERTENSION: ICD-10-CM

## 2022-05-11 DIAGNOSIS — Z29.9 ENCOUNTER FOR PROPHYLACTIC MEASURES, UNSPECIFIED: ICD-10-CM

## 2022-05-11 PROBLEM — M17.11 UNILATERAL PRIMARY OSTEOARTHRITIS, RIGHT KNEE: Chronic | Status: ACTIVE | Noted: 2017-08-09

## 2022-05-11 PROBLEM — E78.5 HYPERLIPIDEMIA, UNSPECIFIED: Chronic | Status: ACTIVE | Noted: 2017-08-09

## 2022-05-11 PROBLEM — M81.0 AGE-RELATED OSTEOPOROSIS WITHOUT CURRENT PATHOLOGICAL FRACTURE: Chronic | Status: ACTIVE | Noted: 2017-08-09

## 2022-05-11 LAB
ALBUMIN SERPL ELPH-MCNC: 3.5 G/DL — SIGNIFICANT CHANGE UP (ref 3.5–5)
ALP SERPL-CCNC: 77 U/L — SIGNIFICANT CHANGE UP (ref 40–120)
ALT FLD-CCNC: 21 U/L DA — SIGNIFICANT CHANGE UP (ref 10–60)
ANION GAP SERPL CALC-SCNC: 8 MMOL/L — SIGNIFICANT CHANGE UP (ref 5–17)
AST SERPL-CCNC: 16 U/L — SIGNIFICANT CHANGE UP (ref 10–40)
BASOPHILS # BLD AUTO: 0.03 K/UL — SIGNIFICANT CHANGE UP (ref 0–0.2)
BASOPHILS NFR BLD AUTO: 0.3 % — SIGNIFICANT CHANGE UP (ref 0–2)
BILIRUB SERPL-MCNC: 0.4 MG/DL — SIGNIFICANT CHANGE UP (ref 0.2–1.2)
BUN SERPL-MCNC: 24 MG/DL — HIGH (ref 7–18)
CALCIUM SERPL-MCNC: 9.4 MG/DL — SIGNIFICANT CHANGE UP (ref 8.4–10.5)
CHLORIDE SERPL-SCNC: 102 MMOL/L — SIGNIFICANT CHANGE UP (ref 96–108)
CO2 SERPL-SCNC: 27 MMOL/L — SIGNIFICANT CHANGE UP (ref 22–31)
CREAT SERPL-MCNC: 1.11 MG/DL — SIGNIFICANT CHANGE UP (ref 0.5–1.3)
EGFR: 50 ML/MIN/1.73M2 — LOW
EOSINOPHIL # BLD AUTO: 0.01 K/UL — SIGNIFICANT CHANGE UP (ref 0–0.5)
EOSINOPHIL NFR BLD AUTO: 0.1 % — SIGNIFICANT CHANGE UP (ref 0–6)
GLUCOSE SERPL-MCNC: 123 MG/DL — HIGH (ref 70–99)
HCT VFR BLD CALC: 33.1 % — LOW (ref 34.5–45)
HGB BLD-MCNC: 11.2 G/DL — LOW (ref 11.5–15.5)
IMM GRANULOCYTES NFR BLD AUTO: 0.5 % — SIGNIFICANT CHANGE UP (ref 0–1.5)
LYMPHOCYTES # BLD AUTO: 0.91 K/UL — LOW (ref 1–3.3)
LYMPHOCYTES # BLD AUTO: 9.6 % — LOW (ref 13–44)
MCHC RBC-ENTMCNC: 28.6 PG — SIGNIFICANT CHANGE UP (ref 27–34)
MCHC RBC-ENTMCNC: 33.8 GM/DL — SIGNIFICANT CHANGE UP (ref 32–36)
MCV RBC AUTO: 84.4 FL — SIGNIFICANT CHANGE UP (ref 80–100)
MONOCYTES # BLD AUTO: 0.7 K/UL — SIGNIFICANT CHANGE UP (ref 0–0.9)
MONOCYTES NFR BLD AUTO: 7.4 % — SIGNIFICANT CHANGE UP (ref 2–14)
NEUTROPHILS # BLD AUTO: 7.75 K/UL — HIGH (ref 1.8–7.4)
NEUTROPHILS NFR BLD AUTO: 82.1 % — HIGH (ref 43–77)
NRBC # BLD: 0 /100 WBCS — SIGNIFICANT CHANGE UP (ref 0–0)
PLATELET # BLD AUTO: 394 K/UL — SIGNIFICANT CHANGE UP (ref 150–400)
POTASSIUM SERPL-MCNC: 3.5 MMOL/L — SIGNIFICANT CHANGE UP (ref 3.5–5.3)
POTASSIUM SERPL-SCNC: 3.5 MMOL/L — SIGNIFICANT CHANGE UP (ref 3.5–5.3)
PROT SERPL-MCNC: 8.1 G/DL — SIGNIFICANT CHANGE UP (ref 6–8.3)
RBC # BLD: 3.92 M/UL — SIGNIFICANT CHANGE UP (ref 3.8–5.2)
RBC # FLD: 13.7 % — SIGNIFICANT CHANGE UP (ref 10.3–14.5)
SARS-COV-2 RNA SPEC QL NAA+PROBE: SIGNIFICANT CHANGE UP
SODIUM SERPL-SCNC: 137 MMOL/L — SIGNIFICANT CHANGE UP (ref 135–145)
WBC # BLD: 9.45 K/UL — SIGNIFICANT CHANGE UP (ref 3.8–10.5)
WBC # FLD AUTO: 9.45 K/UL — SIGNIFICANT CHANGE UP (ref 3.8–10.5)

## 2022-05-11 PROCEDURE — 99223 1ST HOSP IP/OBS HIGH 75: CPT | Mod: GC

## 2022-05-11 PROCEDURE — 72131 CT LUMBAR SPINE W/O DYE: CPT | Mod: 26,MA

## 2022-05-11 PROCEDURE — 99285 EMERGENCY DEPT VISIT HI MDM: CPT

## 2022-05-11 RX ORDER — ACETAMINOPHEN 500 MG
650 TABLET ORAL EVERY 4 HOURS
Refills: 0 | Status: DISCONTINUED | OUTPATIENT
Start: 2022-05-11 | End: 2022-05-12

## 2022-05-11 RX ORDER — ATORVASTATIN CALCIUM 80 MG/1
20 TABLET, FILM COATED ORAL AT BEDTIME
Refills: 0 | Status: DISCONTINUED | OUTPATIENT
Start: 2022-05-11 | End: 2022-05-17

## 2022-05-11 RX ORDER — FERROUS SULFATE 325(65) MG
1 TABLET ORAL
Qty: 0 | Refills: 0 | DISCHARGE

## 2022-05-11 RX ORDER — LEVOTHYROXINE SODIUM 125 MCG
1 TABLET ORAL
Qty: 0 | Refills: 0 | DISCHARGE

## 2022-05-11 RX ORDER — ACETAMINOPHEN 500 MG
975 TABLET ORAL ONCE
Refills: 0 | Status: COMPLETED | OUTPATIENT
Start: 2022-05-11 | End: 2022-05-11

## 2022-05-11 RX ORDER — ALENDRONATE SODIUM 70 MG/1
1 TABLET ORAL
Qty: 0 | Refills: 0 | DISCHARGE

## 2022-05-11 RX ORDER — IBUPROFEN 200 MG
600 TABLET ORAL ONCE
Refills: 0 | Status: COMPLETED | OUTPATIENT
Start: 2022-05-11 | End: 2022-05-11

## 2022-05-11 RX ORDER — MULTIVIT-MIN/FERROUS GLUCONATE 9 MG/15 ML
1 LIQUID (ML) ORAL
Qty: 0 | Refills: 0 | DISCHARGE

## 2022-05-11 RX ORDER — TRIAMTERENE/HYDROCHLOROTHIAZID 75 MG-50MG
1 TABLET ORAL DAILY
Refills: 0 | Status: DISCONTINUED | OUTPATIENT
Start: 2022-05-11 | End: 2022-05-17

## 2022-05-11 RX ORDER — KETOROLAC TROMETHAMINE 30 MG/ML
30 SYRINGE (ML) INJECTION EVERY 6 HOURS
Refills: 0 | Status: DISCONTINUED | OUTPATIENT
Start: 2022-05-11 | End: 2022-05-12

## 2022-05-11 RX ORDER — ASPIRIN/CALCIUM CARB/MAGNESIUM 324 MG
1 TABLET ORAL
Qty: 0 | Refills: 0 | DISCHARGE

## 2022-05-11 RX ORDER — OXYCODONE AND ACETAMINOPHEN 5; 325 MG/1; MG/1
1 TABLET ORAL EVERY 4 HOURS
Refills: 0 | Status: DISCONTINUED | OUTPATIENT
Start: 2022-05-11 | End: 2022-05-12

## 2022-05-11 RX ORDER — OXYCODONE HYDROCHLORIDE 5 MG/1
5 TABLET ORAL ONCE
Refills: 0 | Status: DISCONTINUED | OUTPATIENT
Start: 2022-05-11 | End: 2022-05-11

## 2022-05-11 RX ORDER — ENOXAPARIN SODIUM 100 MG/ML
40 INJECTION SUBCUTANEOUS EVERY 24 HOURS
Refills: 0 | Status: DISCONTINUED | OUTPATIENT
Start: 2022-05-11 | End: 2022-05-17

## 2022-05-11 RX ORDER — LEVOTHYROXINE SODIUM 125 MCG
50 TABLET ORAL DAILY
Refills: 0 | Status: DISCONTINUED | OUTPATIENT
Start: 2022-05-11 | End: 2022-05-13

## 2022-05-11 RX ORDER — LIDOCAINE 4 G/100G
1 CREAM TOPICAL DAILY
Refills: 0 | Status: DISCONTINUED | OUTPATIENT
Start: 2022-05-11 | End: 2022-05-12

## 2022-05-11 RX ORDER — AMLODIPINE BESYLATE 2.5 MG/1
1 TABLET ORAL
Qty: 0 | Refills: 0 | DISCHARGE

## 2022-05-11 RX ORDER — OMEGA-3 ACID ETHYL ESTERS 1 G
1 CAPSULE ORAL
Qty: 0 | Refills: 0 | DISCHARGE

## 2022-05-11 RX ORDER — ATORVASTATIN CALCIUM 80 MG/1
1 TABLET, FILM COATED ORAL
Qty: 0 | Refills: 0 | DISCHARGE

## 2022-05-11 RX ORDER — TRIAMTERENE/HYDROCHLOROTHIAZID 75 MG-50MG
1 TABLET ORAL
Qty: 0 | Refills: 0 | DISCHARGE

## 2022-05-11 RX ORDER — MORPHINE SULFATE 50 MG/1
2 CAPSULE, EXTENDED RELEASE ORAL ONCE
Refills: 0 | Status: DISCONTINUED | OUTPATIENT
Start: 2022-05-11 | End: 2022-05-11

## 2022-05-11 RX ORDER — DOCUSATE SODIUM 100 MG
1 CAPSULE ORAL
Qty: 0 | Refills: 0 | DISCHARGE

## 2022-05-11 RX ADMIN — Medication 975 MILLIGRAM(S): at 13:46

## 2022-05-11 RX ADMIN — Medication 600 MILLIGRAM(S): at 15:24

## 2022-05-11 RX ADMIN — MORPHINE SULFATE 2 MILLIGRAM(S): 50 CAPSULE, EXTENDED RELEASE ORAL at 16:40

## 2022-05-11 RX ADMIN — OXYCODONE HYDROCHLORIDE 5 MILLIGRAM(S): 5 TABLET ORAL at 15:24

## 2022-05-11 RX ADMIN — Medication 600 MILLIGRAM(S): at 13:47

## 2022-05-11 RX ADMIN — OXYCODONE AND ACETAMINOPHEN 1 TABLET(S): 5; 325 TABLET ORAL at 23:38

## 2022-05-11 RX ADMIN — Medication 975 MILLIGRAM(S): at 15:24

## 2022-05-11 RX ADMIN — Medication 50 MICROGRAM(S): at 23:38

## 2022-05-11 RX ADMIN — ENOXAPARIN SODIUM 40 MILLIGRAM(S): 100 INJECTION SUBCUTANEOUS at 23:37

## 2022-05-11 RX ADMIN — ATORVASTATIN CALCIUM 20 MILLIGRAM(S): 80 TABLET, FILM COATED ORAL at 23:38

## 2022-05-11 RX ADMIN — MORPHINE SULFATE 2 MILLIGRAM(S): 50 CAPSULE, EXTENDED RELEASE ORAL at 16:39

## 2022-05-11 RX ADMIN — LIDOCAINE 1 PATCH: 4 CREAM TOPICAL at 23:37

## 2022-05-11 RX ADMIN — OXYCODONE HYDROCHLORIDE 5 MILLIGRAM(S): 5 TABLET ORAL at 13:46

## 2022-05-11 NOTE — ED ADULT NURSE NOTE - NSIMPLEMENTINTERV_GEN_ALL_ED
PA Implemented All Universal Safety Interventions:  [place] to call system. Call bell, personal items and telephone within reach. Instruct patient to call for assistance. Room bathroom lighting operational. Non-slip footwear when patient is off stretcher. Physically safe environment: no spills, clutter or unnecessary equipment. Stretcher in lowest position, wheels locked, appropriate side rails in place.

## 2022-05-11 NOTE — H&P ADULT - NSHPPHYSICALEXAM_GEN_ALL_CORE
Vital Signs Last 24 Hrs  T(C): 36.6 (11 May 2022 12:09), Max: 36.6 (11 May 2022 12:09)  T(F): 97.8 (11 May 2022 12:09), Max: 97.8 (11 May 2022 12:09)  HR: 78 (11 May 2022 12:09) (78 - 78)  BP: 164/89 (11 May 2022 12:09) (164/89 - 164/89)  RR: 16 (11 May 2022 12:09) (16 - 16)  SpO2: 96% (11 May 2022 12:09) (96% - 96%)    GENERAL: NAD, lying in bed comfortably  HEAD:  Atraumatic, Normocephalic  EYES: EOMI, PERRLA, conjunctiva and sclera clear  ENT: Moist mucous membranes  NECK: Supple, No JVD  CHEST/LUNG: Clear to auscultation bilaterally; No rales, rhonchi, wheezing, or rubs. Unlabored respirations  HEART: Regular rate and rhythm; No murmurs, rubs, or gallops  ABDOMEN: Bowel sounds present; Soft, Nontender, Nondistended. No hepatomegaly  EXTREMITIES:  2+ Peripheral Pulses, brisk capillary refill. No clubbing, cyanosis, or edema  NERVOUS SYSTEM:  Alert & Oriented X3, speech clear. No deficits   MSK: FROM all 4 extremities, full and equal strength  SKIN: No rashes or lesions Vital Signs Last 24 Hrs  T(C): 36.6 (11 May 2022 12:09), Max: 36.6 (11 May 2022 12:09)  T(F): 97.8 (11 May 2022 12:09), Max: 97.8 (11 May 2022 12:09)  HR: 78 (11 May 2022 12:09) (78 - 78)  BP: 164/89 (11 May 2022 12:09) (164/89 - 164/89)  RR: 16 (11 May 2022 12:09) (16 - 16)  SpO2: 96% (11 May 2022 12:09) (96% - 96%)    GENERAL: NAD, lying in bed comfortably  HEAD:  Atraumatic, Normocephalic  EYES: EOMI, PERRLA, conjunctiva and sclera clear  ENT: Moist mucous membranes  NECK: Supple, No JVD  CHEST/LUNG: Clear to auscultation bilaterally; No rales, rhonchi, wheezing, or rubs. Unlabored respirations  HEART: Regular rate and rhythm; No murmurs, rubs, or gallops  ABDOMEN: Bowel sounds present; Soft, Nontender, Nondistended. No hepatomegaly  EXTREMITIES:  2+ Peripheral Pulses, brisk capillary refill. No clubbing, cyanosis, or edema  NERVOUS SYSTEM:  Alert & Oriented X3, speech clear. No deficits   MSK: Tenderness over lower back and bilateral legs. FROM all 4 extremities, full and equal strength  SKIN: No rashes or lesions

## 2022-05-11 NOTE — H&P ADULT - ATTENDING COMMENTS
80 female osteoporosis, HTN, HLD, presenting with worsening lower back pain and was having trouble to walk. Admitted for ambulatory dysfunction, acute on chronic lower back pain, proximal LE pain.    #Ambulatory dysfunction  #Acute on chronic lower back pain  #Proximal LE pain  #osteoporosis  #HTN, HLD  Worsening lower back pain for past few weeks and now having difficulty with ambulation due to pain. Denies any falls or trauma or weakness. Able to urinate and have BM. Denies fever, chills, CP, SOB, abdominal pain, dysuria, LOC. CT LE neg  - Pain control: tylenol, toradol, oxycodone, lidocaine patch  - f/u Pain management  - PT  - Continue home osteoporosis, HTN, HLD meds  - DVT ppx 81 female osteoporosis, HTN, HLD, presenting with worsening lower back pain and was having trouble to walk. Admitted for ambulatory dysfunction, acute on chronic lower back pain, proximal LE pain.    #Ambulatory dysfunction  #Acute on chronic lower back pain  #Proximal LE pain  #osteoporosis  #HTN, HLD  Worsening lower back pain for past few weeks and now having difficulty with ambulation due to pain. Denies any falls or trauma or weakness. Able to urinate and have BM. Denies fever, chills, CP, SOB, abdominal pain, dysuria, LOC. CT LE neg  - Pain control: tylenol, toradol, oxycodone, lidocaine patch  - f/u Pain management  - PT  - Continue home osteoporosis, HTN, HLD meds  - DVT ppx

## 2022-05-11 NOTE — ED PROVIDER NOTE - NSICDXPASTMEDICALHX_GEN_ALL_CORE_FT
PAST MEDICAL HISTORY:  Essential hypertension     Hyperlipidemia, unspecified hyperlipidemia type     Osteoporosis, unspecified osteoporosis type, unspecified pathological fracture presence     Primary osteoarthritis of right knee

## 2022-05-11 NOTE — H&P ADULT - ASSESSMENT
80 female osteoporosis, HTN, HLD, now w lbp for 1 days which is mod to severe, was having trouble to walk. no trauma. Admitted for ambulatory dysfunction. 81 female osteoporosis, HTN, HLD, now w lbp for 1 days which is mod to severe, was having trouble to walk. no trauma. Admitted for ambulatory dysfunction.

## 2022-05-11 NOTE — ED PROVIDER NOTE - NSICDXFAMILYHX_GEN_ALL_CORE_FT
FAMILY HISTORY:  Sibling  Still living? No  Family history of asthma in sister, Age at diagnosis: Age Unknown

## 2022-05-11 NOTE — ED PROVIDER NOTE - OBJECTIVE STATEMENT
80 female osteoporosis, HTN, HLD, now w lbp for 1 days which is mod to severe, was having trouble to walk. no trauma. No numbness / tingling / urinary incont or retention / bowel probs / ivdu / fevers. was out walking at the time. notes she lives alone.

## 2022-05-11 NOTE — H&P ADULT - PROBLEM SELECTOR PLAN 1
Pt presented with lower back and leg pain for 13 days  Vitals stable  Physical exam showed lumbar spine tenderness   CT lumbar spine showed no acute fracture and multilevel degenerative changes  started on lidocaine patch, tramadol, percocet and tylenol PRNs for pain  Pain management consulted  Physical therapy consulted

## 2022-05-11 NOTE — ED PROVIDER NOTE - CARE PLAN
1 Principal Discharge DX:	Lower back pain  Assessment and plan of treatment:	intractable requiring iv narcotics, gait instability

## 2022-05-11 NOTE — ED PROVIDER NOTE - CONSTITUTIONAL, MLM
Patient Education     The Growing Child: School-Age (6 to 12 Years)  Children progress at different rates. They have different interests, abilities, and personalities. But there are some common milestones many children reach from ages 6 to 12.    What can my child do at these ages?  As your child grows, you’ll notice him or her developing new and exciting abilities.  A child age 6 to 7:  · Enjoys many activities and stays busy  · Likes to paint and draw  · Practices skills in order to become better  · Jumps rope  · Rides a bike  A child age 8 to 9:  · Is more graceful with movements and abilities  · Jumps, skips, and chases  · Dresses and grooms self completely  · Can use tools, such as a hammer or screwdriver  A child age 10 to 12:  · Likes to sew and paint  What does my child understand?  As children enter into school age, their skills and understanding of concepts continue to grow.  A child age 6 to 7:  · Understands the concept of numbers  · Knows daytime and nighttime  · Knows right and left hands  · Can copy complex shapes, such as a geri  · Can tell time  · Understands commands that have 3 separate instructions  · Can explain objects and their use  · Can repeat 3 numbers backward  · Can read age-appropriate books  A child age 8 to 9:  · Can count backward  · Knows the date  · Reads more and enjoys reading  · Understands fractions  · Understands the concept of space  · Draws and paints  · Can name the months and days of the week, in order  · Enjoys collecting objects  A child age 10 to 12:  · Writes stories  · Likes to write letters  · Reads well  · Enjoys using the telephone  How will my child interact with others?  An important part of growing up is learning to interact and socialize with others. During the school-age years, you’ll see a change in your child. He or she will move from playing alone to having multiple friends and social groups. Friendships become more important. But your child is still  fond of you as parents, and likes being part of a family. Below are some of the common traits that your child may show at these ages.  A child age 6 to 7:  · Cooperates and shares  · Can be jealous of others and siblings  · Likes to copy adults  · Likes to play alone, but friends are becoming important  · Plays with friends of the same gender  · May sometimes have temper tantrums  · Is modest about his or her body  · Likes to play board games  A child age 8 to 9:  · Likes competition and games  · Starts to mix friends and play with children of the opposite gender  · Is modest about his or her body  · Enjoys clubs and groups, such as Boy Scouts or Girl Scouts  · Is becoming interested in boy-girl relationships, but doesn’t admit it  A child age 10 to 12:  · Finds friends are very important and may have a best friend  · Has increased interest in the opposite gender  · Likes and respects parents  · Enjoys talking to others  How can I encourage my child's social abilities?  You can help boost your school-aged child's social abilities by:  · Setting limits, guidelines, and expectations and enforcing them with appropriate penalties  · Modeling good behavior  · Complimenting your child for being cooperative and for personal achievements  · Helping your child choose activities that are suitable for his or her abilities  · Encouraging your child to talk with you and be open with his or her feelings  · Encouraging your child to read, and reading with your child  · Encouraging your child to get involved with hobbies and other activities  · Promoting physical activity  · Encouraging self-discipline and expecting your child to follow rules that are set  · Teaching your child to respect and listen to authority figures  · Encouraging your child to talk about peer pressure and setting guidelines to deal with peer pressure  · Spending uninterrupted time together and giving full attention to your child  · Limiting screen time (TV,  video, and computer)   [employer] last reviewed this educational content on 12/1/2018  © 5629-8007 [employer]. All rights reserved. This information is not intended as a substitute for professional medical care. Always follow your healthcare professional's instructions.           Patient Education     Well-Child Checkup: 6 to 10 Years     Struggles in school can indicate problems with a child’s health or development. If your child is having trouble in school, talk to the child’s healthcare provider.   Even if your child is healthy, keep bringing him or her in for yearly checkups. These visits make sure that your child’s health is protected with scheduled vaccines and health screenings. Your child's healthcare provider will also check his or her growth and development. This sheet describes some of what you can expect.  School and social issues  Here are some topics you, your child, and the healthcare provider may want to discuss during this visit:  · Reading. Does your child like to read? Is the child reading at the right level for his or her age group?   · Friendships. Does your child have friends at school? How do they get along? Do you like your child’s friends? Do you have any concerns about your child’s friendships or problems that may be happening with other children, such as bullying?  · Activities. What does your child like to do for fun? Is he or she involved in after-school activities such as sports, scouting, or music classes?   · Family interaction. How are things at home? Does your child have good relationships with others in the family? Does he or she talk to you about problems? How is the child’s behavior at home?   · Behavior and participation at school. How does your child act at school? Does the child follow the classroom routine and take part in group activities? What do teachers say about the child’s behavior? Is homework finished on time? Do you or other family members help with  homework?  · Household chores. Does your child help around the house with chores such as taking out the trash or setting the table?  Nutrition and exercise tips  Teaching your child healthy eating and lifestyle habits can lead to a lifetime of good health. To help, set a good example with your words and actions. Remember, good habits formed now will stay with your child forever. Here are some tips:  · Help your child get at least 30 to 60 minutes of active play per day. Moving around helps keep your child healthy. Go to the park, ride bikes, or play active games like tag or ball.  · Limit “screen time” to 1 hour each day. This includes time spent watching TV, playing video games, using the computer, and texting. If your child has a TV, computer, or video game console in the bedroom, replace it with a music player. For many kids, dancing and singing are fun ways to get moving.  · Limit sugary drinks. Soda, juice, and sports drinks lead to unhealthy weight gain and tooth decay. Water and low-fat or nonfat milk are best to drink. In moderation (6 ounces for a child 6 years old and 12 ounces for a child 7 to 10 years old daily), 100% fruit juice is OK. Save soda and other sugary drinks for special occasions.   · Serve nutritious foods. Keep a variety of healthy foods on hand for snacks, including fresh fruits and vegetables, lean meats, and whole grains. Foods like french fries, candy, and snack foods should only be served rarely.   · Serve child-sized portions. Children don’t need as much food as adults. Serve your child portions that make sense for his or her age and size. Let your child stop eating when he or she is full. If your child is still hungry after a meal, offer more vegetables or fruit.  · Ask the healthcare provider about your child’s weight. Your child should gain about 4 to 5 pounds (1.81 to 2.27 kg) each year. If your child is gaining more than that, talk to the healthcare provider about healthy eating  habits and exercise guidelines.  · Bring your child to the dentist at least twice a year for teeth cleaning and a checkup.  Sleeping tips  Now that your child is in school, a good night’s sleep is even more important. At this age, your child needs about 10 hours of sleep each night. Here are some tips:  · Set a bedtime and make sure your child follows it each night.  · TV, computer, and video games can agitate a child and make it hard to calm down for the night. Turn them off at least an hour before bed. Instead, read a chapter of a book together.  · Remind your child to brush and floss his or her teeth before bed. Directly supervise your child's dental self-care to make sure that both the back teeth and the front teeth are cleaned.  Safety tips  Recommendations to keep your child safe include the following:   · When riding a bike, your child should wear a helmet with the strap fastened. While roller-skating, roller-blading, or using a scooter or skateboard, it’s safest to wear wrist guards, elbow pads, knee pads, and a helmet.  · In the car, continue to use a booster seat until your child is taller than 4 feet 9 inches. At this height, kids are able to sit with the seat belt fitting correctly over the collarbone and hips. Ask the healthcare provider if you have questions about when your child will be ready to stop using a booster seat. All children younger than 13 should sit in the back seat.  · Teach your child not to talk to strangers or go anywhere with a stranger.  · Teach your child to swim. Many communities offer low-cost swimming lessons. Do not let your child play in or around a pool unattended, even if he or she knows how to swim.  Vaccines  Based on recommendations from the CDC, at this visit your child may receive the following vaccines:  · Diphtheria, tetanus, and pertussis (age 6 only)  · Human papillomavirus (HPV) (ages 9 and up)  · Influenza (flu), annually  · Measles, mumps, and rubella (age  6)  · Polio (age 6)  · Varicella (chickenpox) (age 6)  Bedwetting: It’s not your child’s fault  Bedwetting, or urinating when sleeping, can be frustrating for both you and your child. But it’s usually not a sign of a major problem. Your child’s body may simply need more time to mature. If a child suddenly starts wetting the bed, the cause is often a lifestyle change (such as starting school) or a stressful event (such as the birth of a sibling). But whatever the cause, it’s not in your child’s direct control. If your child wets the bed:  · Keep in mind that your child is not wetting on purpose. Never punish or tease a child for wetting the bed. Punishment or shaming may make the problem worse, not better.  · To help your child, be positive and supportive. Praise your child for not wetting and even for trying hard to stay dry.  · Two hours before bedtime don’t serve your child anything to drink.  · Remind your child to use the toilet before bed. You could also wake him or her to use the bathroom before you go to bed yourself.  · Have a routine for changing sheets and pajamas when the child wets. Try to make this routine as calm and orderly as possible. This will help keep both you and your child from getting too upset or frustrated to go back to sleep.  · Put up a calendar or chart and give your child a star or sticker for nights that he or she doesn’t wet the bed.  · Encourage your child to get out of bed and try to use the toilet if he or she wakes during the night. Put night-lights in the bedroom, hallway, and bathroom to help your child feel safer walking to the bathroom.  · If you have concerns about bedwetting, discuss them with the healthcare provider.  [employer] last reviewed this educational content on 4/1/2020 © 2000-2020 [employer]. All rights reserved. This information is not intended as a substitute for professional medical care. Always follow your healthcare professional's  Well appearing, awake, alert, oriented to person, place, time/situation and in no apparent distress. instructions.            normal...

## 2022-05-11 NOTE — ED ADULT NURSE NOTE - NSFALLRSKASSESSDT_ED_ALL_ED
Telephone Encounter by Peabody, Diane, RN at 05/17/18 02:49 PM     Author:  Peabody, Diane, RN Service:  (none) Author Type:  Registered Nurse     Filed:  05/17/18 02:49 PM Encounter Date:  5/17/2018 Status:  Signed     :  Peabody, Diane, RN (Registered Nurse)            Se note to patient.[DP1.1M]      Revision History        User Key Date/Time User Provider Type Action    > DP1.1 05/17/18 02:49 PM Peabody, Diane, RN Registered Nurse Sign    M - Manual             11-May-2022 13:08

## 2022-05-11 NOTE — ED ADULT NURSE NOTE - ED STAT RN HANDOFF DETAILS
received pt.in bed at 1910 pt.is awake and responsive.,denies pain. admitetd to medicine for UTI. transfer to holding area.report given to remy miramontes.not in distress

## 2022-05-11 NOTE — H&P ADULT - HISTORY OF PRESENT ILLNESS
Pt is a 80 female osteoporosis, HTN, HLD, now w lbp for 1 days which is mod to severe, was having trouble to walk. no trauma. No numbness / tingling / urinary incont or retention / bowel probs / ivdu / fevers. was out walking at the time. notes she lives alone. Pt is a 81 yo F lives alone, ambulates with a cane with PMH of Osteoporosis, HTN, HLD presents to the ED with complaints of lower back and leg pain for the last 13 days. Pt has been having worsening lower back pain and bilateral leg pain wherein for the last few days she is unable to walk. No c/o focal deficits, chest pain, shortness of breath, fever, headache, N/V, abdominal pain, urinary complaints. No recent travel/sickness/ change in meds.   Pt is a 80 yo F lives alone, ambulates with a cane with PMH of Osteoporosis, HTN, HLD presents to the ED with complaints of lower back and leg pain for the last 13 days. Pt has been having worsening lower back pain and bilateral leg pain wherein for the last few days she is unable to walk. No c/o focal deficits, chest pain, shortness of breath, fever, headache, N/V, abdominal pain, urinary complaints. No recent travel/sickness/ change in meds.

## 2022-05-11 NOTE — ED PROVIDER NOTE - MUSCULOSKELETAL, MLM
Spine appears normal, range of motion is not limited, no muscle or joint tenderness. no midline spinal ttp. r paraspinal ttp, +slr r side. 5/5 ehl, sensory intact bl le. 2+ dp pulse.

## 2022-05-11 NOTE — ED PROVIDER NOTE - CPE EDP PSYCH NORM
fever/pneumonia/gurgly voice/upper respiratory infection/cough/change of breathing pattern/oral hygiene/position upright (90Y)/throat clearing
normal...

## 2022-05-12 DIAGNOSIS — M19.90 UNSPECIFIED OSTEOARTHRITIS, UNSPECIFIED SITE: ICD-10-CM

## 2022-05-12 DIAGNOSIS — M54.9 DORSALGIA, UNSPECIFIED: ICD-10-CM

## 2022-05-12 DIAGNOSIS — Z87.39 PERSONAL HISTORY OF OTHER DISEASES OF THE MUSCULOSKELETAL SYSTEM AND CONNECTIVE TISSUE: ICD-10-CM

## 2022-05-12 DIAGNOSIS — E03.9 HYPOTHYROIDISM, UNSPECIFIED: ICD-10-CM

## 2022-05-12 DIAGNOSIS — Z02.9 ENCOUNTER FOR ADMINISTRATIVE EXAMINATIONS, UNSPECIFIED: ICD-10-CM

## 2022-05-12 LAB
A1C WITH ESTIMATED AVERAGE GLUCOSE RESULT: 5.6 % — SIGNIFICANT CHANGE UP (ref 4–5.6)
ALBUMIN SERPL ELPH-MCNC: 3 G/DL — LOW (ref 3.5–5)
ALP SERPL-CCNC: 74 U/L — SIGNIFICANT CHANGE UP (ref 40–120)
ALT FLD-CCNC: 19 U/L DA — SIGNIFICANT CHANGE UP (ref 10–60)
ANION GAP SERPL CALC-SCNC: 8 MMOL/L — SIGNIFICANT CHANGE UP (ref 5–17)
AST SERPL-CCNC: 15 U/L — SIGNIFICANT CHANGE UP (ref 10–40)
BASOPHILS # BLD AUTO: 0.04 K/UL — SIGNIFICANT CHANGE UP (ref 0–0.2)
BASOPHILS NFR BLD AUTO: 0.7 % — SIGNIFICANT CHANGE UP (ref 0–2)
BILIRUB SERPL-MCNC: 0.4 MG/DL — SIGNIFICANT CHANGE UP (ref 0.2–1.2)
BUN SERPL-MCNC: 27 MG/DL — HIGH (ref 7–18)
CALCIUM SERPL-MCNC: 9.3 MG/DL — SIGNIFICANT CHANGE UP (ref 8.4–10.5)
CHLORIDE SERPL-SCNC: 104 MMOL/L — SIGNIFICANT CHANGE UP (ref 96–108)
CHOLEST SERPL-MCNC: 172 MG/DL — SIGNIFICANT CHANGE UP
CO2 SERPL-SCNC: 27 MMOL/L — SIGNIFICANT CHANGE UP (ref 22–31)
CREAT SERPL-MCNC: 1.08 MG/DL — SIGNIFICANT CHANGE UP (ref 0.5–1.3)
EGFR: 52 ML/MIN/1.73M2 — LOW
EOSINOPHIL # BLD AUTO: 0.25 K/UL — SIGNIFICANT CHANGE UP (ref 0–0.5)
EOSINOPHIL NFR BLD AUTO: 4.4 % — SIGNIFICANT CHANGE UP (ref 0–6)
ESTIMATED AVERAGE GLUCOSE: 114 MG/DL — SIGNIFICANT CHANGE UP (ref 68–114)
GLUCOSE SERPL-MCNC: 93 MG/DL — SIGNIFICANT CHANGE UP (ref 70–99)
HCT VFR BLD CALC: 34.5 % — SIGNIFICANT CHANGE UP (ref 34.5–45)
HDLC SERPL-MCNC: 56 MG/DL — SIGNIFICANT CHANGE UP
HGB BLD-MCNC: 11.4 G/DL — LOW (ref 11.5–15.5)
IMM GRANULOCYTES NFR BLD AUTO: 0.5 % — SIGNIFICANT CHANGE UP (ref 0–1.5)
LIPID PNL WITH DIRECT LDL SERPL: 99 MG/DL — SIGNIFICANT CHANGE UP
LYMPHOCYTES # BLD AUTO: 1.17 K/UL — SIGNIFICANT CHANGE UP (ref 1–3.3)
LYMPHOCYTES # BLD AUTO: 20.7 % — SIGNIFICANT CHANGE UP (ref 13–44)
MAGNESIUM SERPL-MCNC: 1.8 MG/DL — SIGNIFICANT CHANGE UP (ref 1.6–2.6)
MCHC RBC-ENTMCNC: 28.3 PG — SIGNIFICANT CHANGE UP (ref 27–34)
MCHC RBC-ENTMCNC: 33 GM/DL — SIGNIFICANT CHANGE UP (ref 32–36)
MCV RBC AUTO: 85.6 FL — SIGNIFICANT CHANGE UP (ref 80–100)
MONOCYTES # BLD AUTO: 0.57 K/UL — SIGNIFICANT CHANGE UP (ref 0–0.9)
MONOCYTES NFR BLD AUTO: 10.1 % — SIGNIFICANT CHANGE UP (ref 2–14)
NEUTROPHILS # BLD AUTO: 3.59 K/UL — SIGNIFICANT CHANGE UP (ref 1.8–7.4)
NEUTROPHILS NFR BLD AUTO: 63.6 % — SIGNIFICANT CHANGE UP (ref 43–77)
NON HDL CHOLESTEROL: 116 MG/DL — SIGNIFICANT CHANGE UP
NRBC # BLD: 0 /100 WBCS — SIGNIFICANT CHANGE UP (ref 0–0)
PHOSPHATE SERPL-MCNC: 3.7 MG/DL — SIGNIFICANT CHANGE UP (ref 2.5–4.5)
PLATELET # BLD AUTO: 378 K/UL — SIGNIFICANT CHANGE UP (ref 150–400)
POTASSIUM SERPL-MCNC: 3.8 MMOL/L — SIGNIFICANT CHANGE UP (ref 3.5–5.3)
POTASSIUM SERPL-SCNC: 3.8 MMOL/L — SIGNIFICANT CHANGE UP (ref 3.5–5.3)
PROT SERPL-MCNC: 7.4 G/DL — SIGNIFICANT CHANGE UP (ref 6–8.3)
RBC # BLD: 4.03 M/UL — SIGNIFICANT CHANGE UP (ref 3.8–5.2)
RBC # FLD: 13.7 % — SIGNIFICANT CHANGE UP (ref 10.3–14.5)
SODIUM SERPL-SCNC: 139 MMOL/L — SIGNIFICANT CHANGE UP (ref 135–145)
TRIGL SERPL-MCNC: 86 MG/DL — SIGNIFICANT CHANGE UP
TSH SERPL-MCNC: 6.5 UU/ML — HIGH (ref 0.34–4.82)
WBC # BLD: 5.65 K/UL — SIGNIFICANT CHANGE UP (ref 3.8–10.5)
WBC # FLD AUTO: 5.65 K/UL — SIGNIFICANT CHANGE UP (ref 3.8–10.5)

## 2022-05-12 PROCEDURE — 99232 SBSQ HOSP IP/OBS MODERATE 35: CPT

## 2022-05-12 PROCEDURE — 99222 1ST HOSP IP/OBS MODERATE 55: CPT

## 2022-05-12 RX ORDER — ACETAMINOPHEN 500 MG
1000 TABLET ORAL EVERY 8 HOURS
Refills: 0 | Status: COMPLETED | OUTPATIENT
Start: 2022-05-12 | End: 2022-05-15

## 2022-05-12 RX ORDER — POLYETHYLENE GLYCOL 3350 17 G/17G
17 POWDER, FOR SOLUTION ORAL DAILY
Refills: 0 | Status: DISCONTINUED | OUTPATIENT
Start: 2022-05-12 | End: 2022-05-14

## 2022-05-12 RX ORDER — LIDOCAINE 4 G/100G
2 CREAM TOPICAL DAILY
Refills: 0 | Status: DISCONTINUED | OUTPATIENT
Start: 2022-05-12 | End: 2022-05-17

## 2022-05-12 RX ORDER — SENNA PLUS 8.6 MG/1
2 TABLET ORAL AT BEDTIME
Refills: 0 | Status: DISCONTINUED | OUTPATIENT
Start: 2022-05-12 | End: 2022-05-17

## 2022-05-12 RX ORDER — PANTOPRAZOLE SODIUM 20 MG/1
40 TABLET, DELAYED RELEASE ORAL
Refills: 0 | Status: COMPLETED | OUTPATIENT
Start: 2022-05-12 | End: 2022-05-15

## 2022-05-12 RX ORDER — KETOROLAC TROMETHAMINE 30 MG/ML
15 SYRINGE (ML) INJECTION EVERY 8 HOURS
Refills: 0 | Status: COMPLETED | OUTPATIENT
Start: 2022-05-12 | End: 2022-05-15

## 2022-05-12 RX ORDER — OXYCODONE HYDROCHLORIDE 5 MG/1
5 TABLET ORAL EVERY 6 HOURS
Refills: 0 | Status: DISCONTINUED | OUTPATIENT
Start: 2022-05-12 | End: 2022-05-14

## 2022-05-12 RX ADMIN — LIDOCAINE 2 PATCH: 4 CREAM TOPICAL at 13:18

## 2022-05-12 RX ADMIN — ENOXAPARIN SODIUM 40 MILLIGRAM(S): 100 INJECTION SUBCUTANEOUS at 23:16

## 2022-05-12 RX ADMIN — Medication 15 MILLIGRAM(S): at 11:04

## 2022-05-12 RX ADMIN — Medication 15 MILLIGRAM(S): at 11:34

## 2022-05-12 RX ADMIN — ATORVASTATIN CALCIUM 20 MILLIGRAM(S): 80 TABLET, FILM COATED ORAL at 22:36

## 2022-05-12 RX ADMIN — LIDOCAINE 1 PATCH: 4 CREAM TOPICAL at 12:00

## 2022-05-12 RX ADMIN — SENNA PLUS 2 TABLET(S): 8.6 TABLET ORAL at 22:36

## 2022-05-12 RX ADMIN — Medication 15 MILLIGRAM(S): at 18:11

## 2022-05-12 RX ADMIN — Medication 15 MILLIGRAM(S): at 17:50

## 2022-05-12 RX ADMIN — POLYETHYLENE GLYCOL 3350 17 GRAM(S): 17 POWDER, FOR SOLUTION ORAL at 13:19

## 2022-05-12 RX ADMIN — Medication 1000 MILLIGRAM(S): at 13:49

## 2022-05-12 RX ADMIN — Medication 50 MICROGRAM(S): at 05:46

## 2022-05-12 RX ADMIN — LIDOCAINE 2 PATCH: 4 CREAM TOPICAL at 22:40

## 2022-05-12 RX ADMIN — LIDOCAINE 1 PATCH: 4 CREAM TOPICAL at 07:57

## 2022-05-12 RX ADMIN — OXYCODONE AND ACETAMINOPHEN 1 TABLET(S): 5; 325 TABLET ORAL at 03:03

## 2022-05-12 RX ADMIN — Medication 1 TABLET(S): at 05:47

## 2022-05-12 RX ADMIN — Medication 1000 MILLIGRAM(S): at 13:19

## 2022-05-12 NOTE — PROGRESS NOTE ADULT - PROBLEM SELECTOR PLAN 4
on lovenox for DVT ppx Pt takes synthroid 50mcg at home  TSH 6.5  c/w home meds  repeat TSH, FT4  pt can follow up as outpatient for monitor TSH

## 2022-05-12 NOTE — PATIENT PROFILE ADULT - FALL HARM RISK - HARM RISK INTERVENTIONS

## 2022-05-12 NOTE — CONSULT NOTE ADULT - PROBLEM SELECTOR RECOMMENDATION 9
Pt with acute exacerbation of lumbar back pain radiation to b/l legs which is somatic in nature. CT lumbar wihout acute fx. + degenerative changes, multiple disc bulges and disc narrowing.     High risk medications reviewed. Avoid polypharmacy. Avoid IV opioids. Avoid benzodiazepines. Non-pharmacological sleep aides initiated. Non-opioid medications and non-pharmacological pain management measures initiated.   Opioid pain recommendations   - Oxycodone 5 mg PO q 6 hours PRN severe pain. Monitor for sedation/ respiratory depression.   Non-opioid pain recommendations   - Toradol 15mg IVP q 8 hours x 3 days with PPI   - Acetaminophen 1 gram PO q 8 hours x 3 days. Monitor LFTs  - Lidoderm 4% 2 patches daily.   Bowel Regimen  - Miralax 17G PO daily  - Senna 2 tablets at bedtime for constipation  Mild pain   - Non-pharmacological pain treatment recommendations  - Warm/ Cool packs PRN   - Repositioning, imagery, relaxation, distraction.  - Physical therapy OOB if no contraindications   Recommendations discussed with primary team and RN

## 2022-05-12 NOTE — PROGRESS NOTE ADULT - SUBJECTIVE AND OBJECTIVE BOX
NP Note discussed with  Primary Attending    INTERVAL HPI/OVERNIGHT EVENTS: no new complaints    MEDICATIONS  (STANDING):  acetaminophen     Tablet .. 1000 milliGRAM(s) Oral every 8 hours  atorvastatin 20 milliGRAM(s) Oral at bedtime  enoxaparin Injectable 40 milliGRAM(s) SubCutaneous every 24 hours  ketorolac   Injectable 15 milliGRAM(s) IV Push every 8 hours  levothyroxine 50 MICROGram(s) Oral daily  lidocaine   4% Patch 2 Patch Transdermal daily  pantoprazole    Tablet 40 milliGRAM(s) Oral before breakfast  polyethylene glycol 3350 17 Gram(s) Oral daily  senna 2 Tablet(s) Oral at bedtime  triamterene 37.5 mG/hydrochlorothiazide 25 mG Tablet 1 Tablet(s) Oral daily    MEDICATIONS  (PRN):  oxyCODONE    IR 5 milliGRAM(s) Oral every 6 hours PRN Severe Pain (7 - 10)      __________________________________________________  REVIEW OF SYSTEMS:    CONSTITUTIONAL: No fever,   EYES: no acute visual disturbances  NECK: No pain or stiffness  RESPIRATORY: No cough; No shortness of breath  CARDIOVASCULAR: No chest pain, no palpitations  GASTROINTESTINAL: No pain. No nausea or vomiting; No diarrhea   NEUROLOGICAL: No headache or numbness, no tremors  MUSCULOSKELETAL: low back pain 3/10  GENITOURINARY: no dysuria, no frequency, no hesitancy  PSYCHIATRY: no depression , no anxiety  ALL OTHER  ROS negative        Vital Signs Last 24 Hrs  T(C): 36.4 (12 May 2022 11:20), Max: 36.9 (11 May 2022 17:32)  T(F): 97.6 (12 May 2022 11:20), Max: 98.5 (11 May 2022 17:32)  HR: 84 (12 May 2022 11:20) (55 - 84)  BP: 129/71 (12 May 2022 11:20) (112/- - 155/72)  BP(mean): --  RR: 17 (12 May 2022 11:20) (16 - 18)  SpO2: 98% (12 May 2022 11:20) (97% - 99%)    ________________________________________________  PHYSICAL EXAM:  GENERAL: NAD, conversant  HEENT: Normocephalic;  conjunctivae and sclerae clear; moist mucous membranes;   NECK : supple  CHEST/LUNG: Clear to auscultation bilaterally with good air entry   HEART: S1 S2  regular; no murmurs, gallops or rubs  ABDOMEN: Soft, Nontender, Nondistended; Bowel sounds present  EXTREMITIES: no cyanosis; no edema; no calf tenderness  Musk: limited ROM lower legs due to pain.   SKIN: warm and dry; no rash  NERVOUS SYSTEM:  Awake and alert; Oriented  to place, person and time ; no new deficits    _________________________________________________  LABS:                        11.4   5.65  )-----------( 378      ( 12 May 2022 06:27 )             34.5     05-12    139  |  104  |  27<H>  ----------------------------<  93  3.8   |  27  |  1.08    Ca    9.3      12 May 2022 06:27  Phos  3.7     05-12  Mg     1.8     05-12    TPro  7.4  /  Alb  3.0<L>  /  TBili  0.4  /  DBili  x   /  AST  15  /  ALT  19  /  AlkPhos  74  05-12    CAPILLARY BLOOD GLUCOSE    RADIOLOGY & ADDITIONAL TESTS:    Imaging  Reviewed:  YES  < from: CT Lumbar Spine No Cont (05.11.22 @ 15:06) >    IMPRESSION:    No acute fracture or traumatic subluxation.  Multilevel degenerative changes    < end of copied text >    Consultant(s) Notes Reviewed:   YES      Plan of care was discussed with patient and /or primary care giver; all questions and concerns were addressed

## 2022-05-12 NOTE — CONSULT NOTE ADULT - ASSESSMENT
Confidential Drug Utilization Report  Search Terms: Linda Cazares, 1940Search Date: 05/12/2022 09:28:24 AM  The Drug Utilization Report below displays all of the controlled substance prescriptions, if any, that your patient has filled in the last twelve months. The information displayed on this report is compiled from pharmacy submissions to the Department, and accurately reflects the information as submitted by the pharmacies.    This report was requested by: Alba Manning | Reference #: [id number]    There are no results for the search terms that you entered.

## 2022-05-12 NOTE — PROGRESS NOTE ADULT - ASSESSMENT
80 female osteoporosis, HTN, HLD, now w lbp for 1 days which is mod to severe, was having trouble to walk. no trauma. Admitted for ambulatory dysfunction. CT LS resulted no fx, degenerative changes. pain management and PT consulted.   80 y/o female osteoporosis, HTN, HLD, now w lbp for 1 days which is mod to severe, was having trouble to walk. no trauma. Admitted for ambulatory dysfunction. CT LS resulted no fx, degenerative changes. pain management and PT consulted.

## 2022-05-12 NOTE — PROGRESS NOTE ADULT - PROBLEM SELECTOR PLAN 5
lives alone  SW consulted for safe discharge plan.   PT and pain management consulted. on lovenox for DVT ppx

## 2022-05-12 NOTE — CONSULT NOTE ADULT - SUBJECTIVE AND OBJECTIVE BOX
Source of information: POOL MOMIN, Chart review  Patient language: English  : n/a    HPI:  Pt is a 81 yo F lives alone, ambulates with a cane with PMH of Osteoporosis, HTN, HLD presents to the ED with complaints of lower back and leg pain for the last 13 days. Pt has been having worsening lower back pain and bilateral leg pain wherein for the last few days she is unable to walk. No c/o focal deficits, chest pain, shortness of breath, fever, headache, N/V, abdominal pain, urinary complaints. No recent travel/sickness/ change in meds.   (11 May 2022 16:05)    Pt is admitted for acute low back pain. CT lumbar demonstrates: No acute fracture or traumatic subluxation. Multilevel degenerative changes.   Pain consulted for low back pain. Pt seen and examined at bedside. Reports low back pain and b/l leg pain which started 2 weeks ago. Denies falls/ trauma. Pt unable to rate pain score or describes pain. Denies numbness/ tingling, bladder/ bowel incontinence. Reports she was unable to ambulate yesterday due to the pain. Pain is alleviated by rest, and exacerbated by movement. Pt tolerating PO diet. Denies lethargy, chest pain, SOB, abdominal pain, nausea, vomiting, constipation. Reports last BM 5/11. Patient stated goal for pain control: to be able to take deep breaths, get out of bed to chair and ambulate with tolerable pain control. Pt does not use assistive devices for ambulation at baseline. Pt reports taking Tylenol for arthritis for pain at home.     PAST MEDICAL & SURGICAL HISTORY:  Primary osteoarthritis of right knee      Osteoporosis, unspecified osteoporosis type, unspecified pathological fracture presence      Hyperlipidemia, unspecified hyperlipidemia type      Essential hypertension      No significant past surgical history          FAMILY HISTORY:  Family history of asthma in sister (Sibling)        Social History:   [X ] Denies ETOH use, illicit drug use and smoking    Allergies    No Known Allergies    MEDICATIONS  (STANDING):  acetaminophen     Tablet .. 1000 milliGRAM(s) Oral every 8 hours  atorvastatin 20 milliGRAM(s) Oral at bedtime  enoxaparin Injectable 40 milliGRAM(s) SubCutaneous every 24 hours  ketorolac   Injectable 15 milliGRAM(s) IV Push every 8 hours  levothyroxine 50 MICROGram(s) Oral daily  lidocaine   4% Patch 2 Patch Transdermal daily  pantoprazole    Tablet 40 milliGRAM(s) Oral before breakfast  polyethylene glycol 3350 17 Gram(s) Oral daily  senna 2 Tablet(s) Oral at bedtime  triamterene 37.5 mG/hydrochlorothiazide 25 mG Tablet 1 Tablet(s) Oral daily    MEDICATIONS  (PRN):  oxyCODONE    IR 5 milliGRAM(s) Oral every 6 hours PRN Severe Pain (7 - 10)      Vital Signs Last 24 Hrs  T(C): 36.5 (12 May 2022 07:20), Max: 36.9 (11 May 2022 17:32)  T(F): 97.7 (12 May 2022 07:20), Max: 98.5 (11 May 2022 17:32)  HR: 62 (12 May 2022 07:20) (55 - 78)  BP: 155/72 (12 May 2022 07:20) (112/- - 164/89)  BP(mean): --  RR: 18 (12 May 2022 07:20) (16 - 18)  SpO2: 98% (12 May 2022 07:20) (96% - 99%)    LABS: Reviewed.                          11.4   5.65  )-----------( 378      ( 12 May 2022 06:27 )             34.5     05-12    139  |  104  |  27<H>  ----------------------------<  93  3.8   |  27  |  1.08    Ca    9.3      12 May 2022 06:27  Phos  3.7     05-12  Mg     1.8     05-12    TPro  7.4  /  Alb  3.0<L>  /  TBili  0.4  /  DBili  x   /  AST  15  /  ALT  19  /  AlkPhos  74  05-12      LIVER FUNCTIONS - ( 12 May 2022 06:27 )  Alb: 3.0 g/dL / Pro: 7.4 g/dL / ALK PHOS: 74 U/L / ALT: 19 U/L DA / AST: 15 U/L / GGT: x             CAPILLARY BLOOD GLUCOSE        COVID-19 PCR: NotDetec (11 May 2022 14:53)      Radiology: Reviewed.   < from: CT Lumbar Spine No Cont (05.11.22 @ 15:06) >    ACC: [id number] EXAM:  CT LUMBAR SPINE                          PROCEDURE DATE:  05/11/2022          INTERPRETATION:  Noncontrast CT examination of the lumbar spine    CLINICAL INDICATION: Back pain    TECHNIQUE:  Direct axial CT scanning of the lumbar spine was obtained   without the administration of intravenous contrast.  Sagittal and coronal   reformats were provided. Three-dimensional reconstructions of the liver   spine were created by the radiology technologist.    COMPARISON: None available    FINDINGS:    No acute fracture or traumatic subluxation. Vertebral body height and   facet alignment are maintained.    Grade 1 retrolisthesis of L2 on L3. Multilevel disc space narrowing.   Lumbar lordosis is maintained.    T12-L1: No spinal canal stenosis or neural foraminal narrowing.    L1-L2:  No spinal canal stenosis or neural foraminal narrowing.    L2-L3: Broad-based disc protrusion asymmetric to the right. Mild spinal   canal stenosis. Moderate right neural foraminal narrowing.    L3-L4: Disc bulge and bilateral facet/ligamentous hypertrophy. Mild   spinal canal stenosis. Mild bilateral neural foraminal narrowing.    L4-L5: Disc bulge and bilateral facet/ligamentous hypertrophy. Mild   spinal canal stenosis. Mild bilateral neural foraminal narrowing.    L5-S1: Disc bulge and bilateral facet hypertrophy. Mild spinal canal   stenosis. Moderate left and mild right neural foraminal narrowing.      IMPRESSION:    No acute fracture or traumatic subluxation.  Multilevel degenerative changes    --- End of Report ---            PAWAN GUZMAN MD; Attending Radiologist  This document has been electronically signed. May 11 2022  3:19PM    < end of copied text >      ORT Score -   Family Hx of substance abuse	Female	      Male  Alcohol 	                                           1                     3  Illegal drugs	                                   2                     3  Rx drugs                                           4 	                  4  Personal Hx of substance abuse		  Alcohol 	                                          3	                  3  Illegal drugs                                     4	                  4  Rx drugs                                            5 	                  5  Age between 16- 45 years	           1                     1  hx preadolescent sexual abuse	   3 	                  0  Psychological disease		  ADD, OCD, bipolar, schizophrenia   2	          2  Depression                                           1 	          1  Total: 0    a score of 3 or lower indicates low risk for opioid abuse		  a score of 4-7 indicates moderate risk for opioid abuse		  a score of 8 or higher indicates high risk for opioid abuse    REVIEW OF SYSTEMS:  CONSTITUTIONAL: No fever or fatigue  HEENT:  + difficulty hearing, no change in vision  NECK: No pain or stiffness  RESPIRATORY: No cough, wheezing, chills or hemoptysis; No shortness of breath  CARDIOVASCULAR: No chest pain, palpitations, dizziness, or leg swelling  GASTROINTESTINAL: No loss of appetite, decreased PO intake. No abdominal or epigastric pain. No nausea, vomiting; No diarrhea or constipation.   GENITOURINARY: No dysuria, frequency, hematuria, retention or incontinence  MUSCULOSKELETAL: No joint swelling; + lumbar back and b/l leg pain; no upper motor strength weakness, + lower motor strength weakness, no saddle anesthesia, bowel/bladder incontinence, no falls   NEURO: No headaches, No numbness/tingling b/l LE, No weakness    PHYSICAL EXAM:  GENERAL:  Alert & Oriented X4, + anxious, cooperative, NAD, Good concentration. Speech is clear.   RESPIRATORY: Respirations even and unlabored. Clear to auscultation bilaterally; No rales, rhonchi, wheezing, or rubs  CARDIOVASCULAR: Normal S1/S2, regular rate and rhythm; No murmurs, rubs, or gallops. No JVD.   GASTROINTESTINAL:  Soft, Nontender, Nondistended; Bowel sounds present  PERIPHERAL VASCULAR:  Extremities warm without edema. 2+ Peripheral Pulses, No cyanosis, No calf tenderness  MUSCULOSKELETAL: Motor Strength 4/5 B/L upper and lower extremities; moves all extremities equally against gravity; ROM intact; negative SLR; + lumbar back and b/l leg tenderness on palpation  SKIN: + lidocaine patch over lumbar back; + right knee well approximated healed surgical scar; Warm, dry, intact. No rashes, lesions, or wounds.     Risk factors associated with adverse outcomes related to opioid treatment  [ ]  Concurrent benzodiazepine use  [ ]  History/ Active substance use or alcohol use disorder  [ ] Psychiatric co-morbidity  [ ] Sleep apnea  [ ] COPD  [ ] BMI> 35  [ ] Liver dysfunction  [ ] Renal dysfunction  [ ] CHF  [ ] Smoker  [X ]  Age > 60 years    [ X]  [place]  Reviewed and Copied to Chart. See below.    Plan of care and goal oriented pain management treatment options were discussed with patient and /or primary care giver; all questions and concerns were addressed and care was aligned with patient's wishes.    Educated patient on goal oriented pain management treatment options

## 2022-05-12 NOTE — PATIENT PROFILE ADULT - FUNCTIONAL ASSESSMENT - BASIC MOBILITY 2.
3 = A little assistance Expected Date Of Service: 02/21/2022 Billing Type: Third-Party Bill Bill For Surgical Tray: no Performing Laboratory: 0

## 2022-05-13 LAB
ALBUMIN SERPL ELPH-MCNC: 2.9 G/DL — LOW (ref 3.5–5)
ALP SERPL-CCNC: 80 U/L — SIGNIFICANT CHANGE UP (ref 40–120)
ALT FLD-CCNC: 18 U/L DA — SIGNIFICANT CHANGE UP (ref 10–60)
ANION GAP SERPL CALC-SCNC: 5 MMOL/L — SIGNIFICANT CHANGE UP (ref 5–17)
AST SERPL-CCNC: 15 U/L — SIGNIFICANT CHANGE UP (ref 10–40)
BILIRUB SERPL-MCNC: 0.6 MG/DL — SIGNIFICANT CHANGE UP (ref 0.2–1.2)
BUN SERPL-MCNC: 33 MG/DL — HIGH (ref 7–18)
CALCIUM SERPL-MCNC: 9.3 MG/DL — SIGNIFICANT CHANGE UP (ref 8.4–10.5)
CHLORIDE SERPL-SCNC: 103 MMOL/L — SIGNIFICANT CHANGE UP (ref 96–108)
CO2 SERPL-SCNC: 30 MMOL/L — SIGNIFICANT CHANGE UP (ref 22–31)
CREAT SERPL-MCNC: 1.2 MG/DL — SIGNIFICANT CHANGE UP (ref 0.5–1.3)
EGFR: 45 ML/MIN/1.73M2 — LOW
GLUCOSE SERPL-MCNC: 88 MG/DL — SIGNIFICANT CHANGE UP (ref 70–99)
HCT VFR BLD CALC: 37 % — SIGNIFICANT CHANGE UP (ref 34.5–45)
HGB BLD-MCNC: 12.5 G/DL — SIGNIFICANT CHANGE UP (ref 11.5–15.5)
MCHC RBC-ENTMCNC: 28.5 PG — SIGNIFICANT CHANGE UP (ref 27–34)
MCHC RBC-ENTMCNC: 33.8 GM/DL — SIGNIFICANT CHANGE UP (ref 32–36)
MCV RBC AUTO: 84.5 FL — SIGNIFICANT CHANGE UP (ref 80–100)
MRSA PCR RESULT.: SIGNIFICANT CHANGE UP
NRBC # BLD: 0 /100 WBCS — SIGNIFICANT CHANGE UP (ref 0–0)
PLATELET # BLD AUTO: 434 K/UL — HIGH (ref 150–400)
POTASSIUM SERPL-MCNC: 3.8 MMOL/L — SIGNIFICANT CHANGE UP (ref 3.5–5.3)
POTASSIUM SERPL-SCNC: 3.8 MMOL/L — SIGNIFICANT CHANGE UP (ref 3.5–5.3)
PROT SERPL-MCNC: 8.2 G/DL — SIGNIFICANT CHANGE UP (ref 6–8.3)
RBC # BLD: 4.38 M/UL — SIGNIFICANT CHANGE UP (ref 3.8–5.2)
RBC # FLD: 13.6 % — SIGNIFICANT CHANGE UP (ref 10.3–14.5)
S AUREUS DNA NOSE QL NAA+PROBE: SIGNIFICANT CHANGE UP
SODIUM SERPL-SCNC: 138 MMOL/L — SIGNIFICANT CHANGE UP (ref 135–145)
T4 FREE SERPL-MCNC: 1.2 NG/DL — SIGNIFICANT CHANGE UP (ref 0.9–1.8)
TSH SERPL-MCNC: 6.02 UU/ML — HIGH (ref 0.34–4.82)
WBC # BLD: 4.58 K/UL — SIGNIFICANT CHANGE UP (ref 3.8–10.5)
WBC # FLD AUTO: 4.58 K/UL — SIGNIFICANT CHANGE UP (ref 3.8–10.5)

## 2022-05-13 PROCEDURE — 99231 SBSQ HOSP IP/OBS SF/LOW 25: CPT

## 2022-05-13 PROCEDURE — 99233 SBSQ HOSP IP/OBS HIGH 50: CPT

## 2022-05-13 RX ORDER — LEVOTHYROXINE SODIUM 125 MCG
75 TABLET ORAL DAILY
Refills: 0 | Status: DISCONTINUED | OUTPATIENT
Start: 2022-05-14 | End: 2022-05-17

## 2022-05-13 RX ADMIN — Medication 15 MILLIGRAM(S): at 22:35

## 2022-05-13 RX ADMIN — LIDOCAINE 2 PATCH: 4 CREAM TOPICAL at 05:13

## 2022-05-13 RX ADMIN — Medication 1000 MILLIGRAM(S): at 06:30

## 2022-05-13 RX ADMIN — ATORVASTATIN CALCIUM 20 MILLIGRAM(S): 80 TABLET, FILM COATED ORAL at 21:53

## 2022-05-13 RX ADMIN — Medication 50 MICROGRAM(S): at 05:14

## 2022-05-13 RX ADMIN — Medication 15 MILLIGRAM(S): at 13:51

## 2022-05-13 RX ADMIN — Medication 1000 MILLIGRAM(S): at 23:28

## 2022-05-13 RX ADMIN — LIDOCAINE 2 PATCH: 4 CREAM TOPICAL at 12:16

## 2022-05-13 RX ADMIN — Medication 15 MILLIGRAM(S): at 08:17

## 2022-05-13 RX ADMIN — Medication 5 MILLIGRAM(S): at 11:02

## 2022-05-13 RX ADMIN — POLYETHYLENE GLYCOL 3350 17 GRAM(S): 17 POWDER, FOR SOLUTION ORAL at 12:16

## 2022-05-13 RX ADMIN — ENOXAPARIN SODIUM 40 MILLIGRAM(S): 100 INJECTION SUBCUTANEOUS at 23:28

## 2022-05-13 RX ADMIN — Medication 15 MILLIGRAM(S): at 08:32

## 2022-05-13 RX ADMIN — PANTOPRAZOLE SODIUM 40 MILLIGRAM(S): 20 TABLET, DELAYED RELEASE ORAL at 06:15

## 2022-05-13 RX ADMIN — SENNA PLUS 2 TABLET(S): 8.6 TABLET ORAL at 21:53

## 2022-05-13 RX ADMIN — Medication 1 TABLET(S): at 08:15

## 2022-05-13 RX ADMIN — Medication 15 MILLIGRAM(S): at 14:06

## 2022-05-13 RX ADMIN — Medication 15 MILLIGRAM(S): at 21:53

## 2022-05-13 RX ADMIN — LIDOCAINE 2 PATCH: 4 CREAM TOPICAL at 20:21

## 2022-05-13 RX ADMIN — Medication 1000 MILLIGRAM(S): at 05:36

## 2022-05-13 NOTE — PROGRESS NOTE ADULT - PROBLEM SELECTOR PLAN 1
Pt presented with lower back and leg pain for 13 days  Vitals stable  Physical exam showed lumbar spine tenderness   CT lumbar spine showed no acute fracture and multilevel degenerative changes  started on lidocaine patch, tramadol, percocet and tylenol PRNs for pain  Pain management consulted  Physical therapy consulted Pt presented with lower back and leg pain for 13 days  Vitals stable  Physical exam showed lumbar spine tenderness   CT lumbar spine showed no acute fracture and multilevel degenerative changes  started on lidocaine patch, tramadol, percocet and tylenol PRNs for pain  Pain management consulted  Physical therapy recs MICHAEL

## 2022-05-13 NOTE — PROGRESS NOTE ADULT - PROBLEM SELECTOR PLAN 4
Pt takes synthroid 50mcg at home  TSH 6.5  c/w home meds  repeat TSH, FT4  pt can follow up as outpatient for monitor TSH Pt takes synthroid 50mcg at home  TSH 6.5- synthroid increased to 75 mcg  pt can follow up as outpatient for monitor TSH

## 2022-05-13 NOTE — PROGRESS NOTE ADULT - PROBLEM SELECTOR PLAN 6
lives alone  SW consulted for safe discharge plan.   PT and pain management consulted. lives alone  SW consulted for safe discharge plan.   PT recs MICHAEL  Pain management assisting w/ optimal pain control

## 2022-05-13 NOTE — PROGRESS NOTE ADULT - ASSESSMENT
Confidential Drug Utilization Report  Search Terms: Linda Cazares, 1940Search Date: 05/12/2022 09:28:24 AM  The Drug Utilization Report below displays all of the controlled substance prescriptions, if any, that your patient has filled in the last twelve months. The information displayed on this report is compiled from pharmacy submissions to the Department, and accurately reflects the information as submitted by the pharmacies.    This report was requested by: Alba Manning | Reference #: [id number]    There are no results for the search terms that you entered.

## 2022-05-13 NOTE — PHYSICAL THERAPY INITIAL EVALUATION ADULT - PERTINENT HX OF CURRENT PROBLEM, REHAB EVAL
Pt. admitted for complaints of lower back and leg pain for the last 13 days PTA. Pt has been having worsening lower back pain and bilateral leg pain wherein for the last few days she is unable to walk. CT of lumbar spine showed No acute fracture or traumatic subluxation. Multilevel degenerative changes.

## 2022-05-13 NOTE — PROGRESS NOTE ADULT - PROBLEM SELECTOR PLAN 1
Pt with acute exacerbation of lumbar back pain radiation to b/l legs which is somatic in nature. CT lumbar without acute fx. + degenerative changes, multiple disc bulges and disc narrowing.     High risk medications reviewed. Avoid polypharmacy. Avoid IV opioids. Avoid benzodiazepines. Non-pharmacological sleep aides initiated. Non-opioid medications and non-pharmacological pain management measures initiated.   Opioid pain recommendations   - Oxycodone 5 mg PO q 6 hours PRN severe pain. Monitor for sedation/ respiratory depression.   Non-opioid pain recommendations   - Toradol 15mg IVP q 8 hours x 3 days with PPI   - Acetaminophen 1 gram PO q 8 hours x 3 days. Monitor LFTs  - Lidoderm 4% 2 patches daily.   Bowel Regimen  - Miralax 17G PO daily  - Senna 2 tablets at bedtime for constipation  - Dulcolax 5mg PO x 1  Mild pain   - Non-pharmacological pain treatment recommendations  - Warm/ Cool packs PRN   - Repositioning, imagery, relaxation, distraction.  - Physical therapy OOB if no contraindications   Recommendations discussed with primary team and RN. Pt with acute exacerbation of lumbar back pain radiation to b/l legs which is somatic in nature. CT lumbar without acute fx. + degenerative changes, multiple disc bulges and disc narrowing.     High risk medications reviewed. Avoid polypharmacy. Avoid IV opioids. Avoid benzodiazepines. Non-pharmacological sleep aides initiated. Non-opioid medications and non-pharmacological pain management measures initiated.   Opioid pain recommendations   - Oxycodone 5 mg PO q 6 hours PRN severe pain. Monitor for sedation/ respiratory depression.   Non-opioid pain recommendations   - Toradol 15mg IVP q 8 hours x 3 days with PPI   - Acetaminophen 1 gram PO q 8 hours x 3 days. Monitor LFTs  - Lidoderm 4% 2 patches daily.   Bowel Regimen  - Miralax 17G PO daily  - Senna 2 tablets at bedtime for constipation  - Dulcolax 5mg PO x 1  Mild pain   - Non-pharmacological pain treatment recommendations  - Warm/ Cool packs PRN   - Repositioning, imagery, relaxation, distraction.  - Physical therapy OOB if no contraindications   Recommendations discussed with primary team and RN.  Upon discharge recommend Tylenol 1G PO q8h PRN moderate pain and naproxen 250mg PO q8h PRN severe pain x 3 days with PPI. Pt with acute exacerbation of lumbar back pain radiation to b/l legs which is somatic in nature. CT lumbar without acute fx. + degenerative changes, multiple disc bulges and disc narrowing.     High risk medications reviewed. Avoid polypharmacy. Avoid IV opioids. Avoid benzodiazepines. Non-pharmacological sleep aides initiated. Non-opioid medications and non-pharmacological pain management measures initiated.   Opioid pain recommendations   - Oxycodone 5 mg PO q 6 hours PRN severe pain. Monitor for sedation/ respiratory depression.   Non-opioid pain recommendations   - Toradol 15mg IVP q 8 hours x 3 days with PPI   - Acetaminophen 1 gram PO q 8 hours x 3 days. Monitor LFTs  - Lidoderm 4% 2 patches daily.   Bowel Regimen  - Miralax 17G PO daily  - Senna 2 tablets at bedtime for constipation  - Dulcolax 5mg PO x 1  Mild pain   - Non-pharmacological pain treatment recommendations  - Warm/ Cool packs PRN   - Repositioning, imagery, relaxation, distraction.  - Physical therapy OOB if no contraindications   Recommendations discussed with primary team and RN.  Upon discharge recommend Tylenol 1G PO q8h PRN moderate pain and naproxen 250mg PO q8h PRN severe pain x 3 days with PPI. May follow up with Dr. [facility] [phone]. Solaraze Counseling:  I discussed with the patient the risks of Solaraze including but not limited to erythema, scaling, itching, weeping, crusting, and pain.

## 2022-05-13 NOTE — PHYSICAL THERAPY INITIAL EVALUATION ADULT - GENERAL OBSERVATIONS, REHAB EVAL
Pt. received supine in bed, continues to c/o low back pain radiating to nicholas. LE's. Pt.  cooperative and motivated during eval.  Pt. A&O x 3 but with periods of forgetfulness.

## 2022-05-13 NOTE — PROGRESS NOTE ADULT - SUBJECTIVE AND OBJECTIVE BOX
Source of information: [facility], Chart review  Patient language: English  : n/a    HPI:  Pt is a 81 yo F lives alone, ambulates with a cane with PMH of Osteoporosis, HTN, HLD presents to the ED with complaints of lower back and leg pain for the last 13 days. Pt has been having worsening lower back pain and bilateral leg pain wherein for the last few days she is unable to walk. No c/o focal deficits, chest pain, shortness of breath, fever, headache, N/V, abdominal pain, urinary complaints. No recent travel/sickness/ change in meds.   (11 May 2022 16:05)    Pt is admitted for acute low back pain. CT lumbar demonstrates: No acute fracture or traumatic subluxation. Multilevel degenerative changes.   Pain consulted for low back pain. . Reports low back pain and b/l leg pain which started 2 weeks ago. Denies falls/ trauma. Pt seen and examined at bedside. Sitting in chair, recently completed PT session, was able to ambulate with walker. Reports low back and leg pain has improved since admission. Pt unable to rate pain score, however reports pain is mild. Denies numbness/ tingling, bladder/ bowel incontinence. Pain is alleviated by rest and pain medication, and exacerbated by movement. Pt tolerating PO diet. Denies lethargy, chest pain, SOB, abdominal pain, nausea, vomiting. Reports constipation, last BM 5/11. Patient stated goal for pain control: to be able to take deep breaths, get out of bed to chair and ambulate with tolerable pain control. Pt does not use assistive devices for ambulation at baseline. Pt reports taking Tylenol for arthritis for pain at home.     PAST MEDICAL & SURGICAL HISTORY:  Primary osteoarthritis of right knee      Osteoporosis, unspecified osteoporosis type, unspecified pathological fracture presence      Hyperlipidemia, unspecified hyperlipidemia type      Essential hypertension      No significant past surgical history          FAMILY HISTORY:  Family history of asthma in sister (Sibling)        Social History:   [X ] Denies ETOH use, illicit drug use and smoking    Allergies    No Known Allergies    MEDICATIONS  (STANDING):  acetaminophen     Tablet .. 1000 milliGRAM(s) Oral every 8 hours  atorvastatin 20 milliGRAM(s) Oral at bedtime  enoxaparin Injectable 40 milliGRAM(s) SubCutaneous every 24 hours  ketorolac   Injectable 15 milliGRAM(s) IV Push every 8 hours  levothyroxine 50 MICROGram(s) Oral daily  lidocaine   4% Patch 2 Patch Transdermal daily  pantoprazole    Tablet 40 milliGRAM(s) Oral before breakfast  polyethylene glycol 3350 17 Gram(s) Oral daily  senna 2 Tablet(s) Oral at bedtime  triamterene 37.5 mG/hydrochlorothiazide 25 mG Tablet 1 Tablet(s) Oral daily    MEDICATIONS  (PRN):  oxyCODONE    IR 5 milliGRAM(s) Oral every 6 hours PRN Severe Pain (7 - 10)      Vital Signs Last 24 Hrs  T(C): 36.9 (13 May 2022 04:59), Max: 37.1 (12 May 2022 15:12)  T(F): 98.4 (13 May 2022 04:59), Max: 98.8 (12 May 2022 15:12)  HR: 74 (13 May 2022 09:10) (59 - 74)  BP: 151/72 (13 May 2022 09:10) (123/51 - 151/72)  BP(mean): 88 (13 May 2022 09:10) (76 - 88)  RR: 16 (13 May 2022 04:59) (16 - 18)  SpO2: 100% (13 May 2022 09:10) (99% - 100%)  COVID-19 PCR: NotDetec (11 May 2022 14:53)    LABS: Reviewed                          12.5   4.58  )-----------( 434      ( 13 May 2022 06:33 )             37.0     05-13    138  |  103  |  33<H>  ----------------------------<  88  3.8   |  30  |  1.20    Ca    9.3      13 May 2022 06:33  Phos  3.7     05-12  Mg     1.8     05-12    TPro  8.2  /  Alb  2.9<L>  /  TBili  0.6  /  DBili  x   /  AST  15  /  ALT  18  /  AlkPhos  80  05-13      LIVER FUNCTIONS - ( 13 May 2022 06:33 )  Alb: 2.9 g/dL / Pro: 8.2 g/dL / ALK PHOS: 80 U/L / ALT: 18 U/L DA / AST: 15 U/L / GGT: x           COVID-19 PCR: NotDetec (11 May 2022 14:53)    Radiology: Reviewed.   < from: CT Lumbar Spine No Cont (05.11.22 @ 15:06) >    ACC: [id number] EXAM:  CT LUMBAR SPINE                          PROCEDURE DATE:  05/11/2022          INTERPRETATION:  Noncontrast CT examination of the lumbar spine    CLINICAL INDICATION: Back pain    TECHNIQUE:  Direct axial CT scanning of the lumbar spine was obtained   without the administration of intravenous contrast.  Sagittal and coronal   reformats were provided. Three-dimensional reconstructions of the liver   spine were created by the radiology technologist.    COMPARISON: None available    FINDINGS:    No acute fracture or traumatic subluxation. Vertebral body height and   facet alignment are maintained.    Grade 1 retrolisthesis of L2 on L3. Multilevel disc space narrowing.   Lumbar lordosis is maintained.    T12-L1: No spinal canal stenosis or neural foraminal narrowing.    L1-L2:  No spinal canal stenosis or neural foraminal narrowing.    L2-L3: Broad-based disc protrusion asymmetric to the right. Mild spinal   canal stenosis. Moderate right neural foraminal narrowing.    L3-L4: Disc bulge and bilateral facet/ligamentous hypertrophy. Mild   spinal canal stenosis. Mild bilateral neural foraminal narrowing.    L4-L5: Disc bulge and bilateral facet/ligamentous hypertrophy. Mild   spinal canal stenosis. Mild bilateral neural foraminal narrowing.    L5-S1: Disc bulge and bilateral facet hypertrophy. Mild spinal canal   stenosis. Moderate left and mild right neural foraminal narrowing.      IMPRESSION:    No acute fracture or traumatic subluxation.  Multilevel degenerative changes    --- End of Report ---            PAWAN GUZMAN MD; Attending Radiologist  This document has been electronically signed. May 11 2022  3:19PM    < end of copied text >      ORT Score -   Family Hx of substance abuse	Female	      Male  Alcohol 	                                           1                     3  Illegal drugs	                                   2                     3  Rx drugs                                           4 	                  4  Personal Hx of substance abuse		  Alcohol 	                                          3	                  3  Illegal drugs                                     4	                  4  Rx drugs                                            5 	                  5  Age between 16- 45 years	           1                     1  hx preadolescent sexual abuse	   3 	                  0  Psychological disease		  ADD, OCD, bipolar, schizophrenia   2	          2  Depression                                           1 	          1  Total: 0    a score of 3 or lower indicates low risk for opioid abuse		  a score of 4-7 indicates moderate risk for opioid abuse		  a score of 8 or higher indicates high risk for opioid abuse    REVIEW OF SYSTEMS:  CONSTITUTIONAL: No fever or fatigue  HEENT:  + difficulty hearing, no change in vision  NECK: No pain or stiffness  RESPIRATORY: No cough, wheezing, chills or hemoptysis; No shortness of breath  CARDIOVASCULAR: No chest pain, palpitations, dizziness, or leg swelling  GASTROINTESTINAL: No loss of appetite, decreased PO intake. No abdominal or epigastric pain. No nausea, vomiting; No diarrhea + constipation.   GENITOURINARY: No dysuria, frequency, hematuria, retention or incontinence  MUSCULOSKELETAL: No joint swelling; + lumbar back and b/l leg pain; no upper motor strength weakness, + lower motor strength weakness, no saddle anesthesia, bowel/bladder incontinence, no falls   NEURO: No headaches, No numbness/tingling b/l LE, No weakness    PHYSICAL EXAM:  GENERAL:  Alert & Oriented X4, calm, cooperative, NAD, Good concentration. Speech is clear.   RESPIRATORY: Respirations even and unlabored. Clear to auscultation bilaterally; No rales, rhonchi, wheezing, or rubs  CARDIOVASCULAR: Normal S1/S2, regular rate and rhythm; No murmurs, rubs, or gallops. No JVD.   GASTROINTESTINAL:  Soft, Nontender, Nondistended; Bowel sounds present  PERIPHERAL VASCULAR:  Extremities warm without edema. 2+ Peripheral Pulses, No cyanosis, No calf tenderness  MUSCULOSKELETAL: Motor Strength 4/5 B/L upper and lower extremities; moves all extremities equally against gravity; ROM intact; negative SLR; + lumbar back and b/l leg tenderness on palpation  SKIN: + right knee well approximated healed surgical scar; Warm, dry, intact. No rashes, lesions, or wounds.     Risk factors associated with adverse outcomes related to opioid treatment  [ ]  Concurrent benzodiazepine use  [ ]  History/ Active substance use or alcohol use disorder  [ ] Psychiatric co-morbidity  [ ] Sleep apnea  [ ] COPD  [ ] BMI> 35  [ ] Liver dysfunction  [ ] Renal dysfunction  [ ] CHF  [ ] Smoker  [X ]  Age > 60 years    [ X]  [place]  Reviewed and Copied to Chart. See below.    Plan of care and goal oriented pain management treatment options were discussed with patient and /or primary care giver; all questions and concerns were addressed and care was aligned with patient's wishes.    Educated patient on goal oriented pain management treatment options     05-13-22 @ 12:18

## 2022-05-13 NOTE — PHYSICAL THERAPY INITIAL EVALUATION ADULT - CRITERIA FOR SKILLED THERAPEUTIC INTERVENTIONS
Detail Level: Simple
impairments found/functional limitations in following categories/risk reduction/prevention/rehab potential/therapy frequency/predicted duration of therapy intervention/anticipated discharge recommendation

## 2022-05-13 NOTE — PROGRESS NOTE ADULT - SUBJECTIVE AND OBJECTIVE BOX
NP Note discussed with  Primary Attending    Patient is a 81y old  Female who presents with a chief complaint of Ambulatory dysfunction (13 May 2022 12:18)      INTERVAL HPI/OVERNIGHT EVENTS: no new complaints    MEDICATIONS  (STANDING):  acetaminophen     Tablet .. 1000 milliGRAM(s) Oral every 8 hours  atorvastatin 20 milliGRAM(s) Oral at bedtime  enoxaparin Injectable 40 milliGRAM(s) SubCutaneous every 24 hours  ketorolac   Injectable 15 milliGRAM(s) IV Push every 8 hours  lidocaine   4% Patch 2 Patch Transdermal daily  pantoprazole    Tablet 40 milliGRAM(s) Oral before breakfast  polyethylene glycol 3350 17 Gram(s) Oral daily  senna 2 Tablet(s) Oral at bedtime  triamterene 37.5 mG/hydrochlorothiazide 25 mG Tablet 1 Tablet(s) Oral daily    MEDICATIONS  (PRN):  oxyCODONE    IR 5 milliGRAM(s) Oral every 6 hours PRN Severe Pain (7 - 10)      __________________________________________________  REVIEW OF SYSTEMS:    CONSTITUTIONAL: No fever,   EYES: no acute visual disturbances  NECK: No pain or stiffness  RESPIRATORY: No cough; No shortness of breath  CARDIOVASCULAR: No chest pain, no palpitations  GASTROINTESTINAL: No pain. No nausea or vomiting; No diarrhea   NEUROLOGICAL: No headache or numbness, no tremors  MUSCULOSKELETAL: No joint pain, no muscle pain  GENITOURINARY: no dysuria, no frequency, no hesitancy  PSYCHIATRY: no depression , no anxiety  ALL OTHER  ROS negative        Vital Signs Last 24 Hrs  T(C): 36.9 (13 May 2022 12:25), Max: 36.9 (13 May 2022 04:59)  T(F): 98.4 (13 May 2022 12:25), Max: 98.4 (13 May 2022 04:59)  HR: 62 (13 May 2022 12:25) (59 - 74)  BP: 134/55 (13 May 2022 12:25) (134/55 - 151/72)  BP(mean): 88 (13 May 2022 09:10) (76 - 88)  RR: 17 (13 May 2022 12:25) (16 - 18)  SpO2: 99% (13 May 2022 12:25) (99% - 100%)    ________________________________________________  PHYSICAL EXAM:  GENERAL: NAD  HEENT: Normocephalic;  conjunctivae and sclerae clear; moist mucous membranes;   NECK : supple  CHEST/LUNG: Clear to auscultation bilaterally with good air entry   HEART: S1 S2  regular; no murmurs, gallops or rubs  ABDOMEN: Soft, Nontender, Nondistended; Bowel sounds present  EXTREMITIES: no cyanosis; no edema; no calf tenderness  SKIN: warm and dry; no rash  NERVOUS SYSTEM:  Awake and alert; Oriented  to place, person and time ; no new deficits    _________________________________________________  LABS:                        12.5   4.58  )-----------( 434      ( 13 May 2022 06:33 )             37.0     05-13    138  |  103  |  33<H>  ----------------------------<  88  3.8   |  30  |  1.20    Ca    9.3      13 May 2022 06:33  Phos  3.7     05-12  Mg     1.8     05-12    TPro  8.2  /  Alb  2.9<L>  /  TBili  0.6  /  DBili  x   /  AST  15  /  ALT  18  /  AlkPhos  80  05-13        CAPILLARY BLOOD GLUCOSE            RADIOLOGY & ADDITIONAL TESTS:    Imaging  Reviewed:  YES/NO    Consultant(s) Notes Reviewed:   YES/ No      Plan of care was discussed with patient and /or primary care giver; all questions and concerns were addressed  NP Note discussed with  Primary Attending    Patient is a 81y old  Female who presents with a chief complaint of Ambulatory dysfunction (13 May 2022 09:00)      INTERVAL HPI/OVERNIGHT EVENTS: no new complaints    MEDICATIONS  (STANDING):  acetaminophen     Tablet .. 1000 milliGRAM(s) Oral every 8 hours  atorvastatin 20 milliGRAM(s) Oral at bedtime  enoxaparin Injectable 40 milliGRAM(s) SubCutaneous every 24 hours  ketorolac   Injectable 15 milliGRAM(s) IV Push every 8 hours  lidocaine   4% Patch 2 Patch Transdermal daily  pantoprazole    Tablet 40 milliGRAM(s) Oral before breakfast  polyethylene glycol 3350 17 Gram(s) Oral daily  senna 2 Tablet(s) Oral at bedtime  triamterene 37.5 mG/hydrochlorothiazide 25 mG Tablet 1 Tablet(s) Oral daily    MEDICATIONS  (PRN):  oxyCODONE    IR 5 milliGRAM(s) Oral every 6 hours PRN Severe Pain (7 - 10)      __________________________________________________  REVIEW OF SYSTEMS:    CONSTITUTIONAL: No fever,   EYES: no acute visual disturbances  NECK: No pain or stiffness  RESPIRATORY: No cough; No shortness of breath  CARDIOVASCULAR: No chest pain, no palpitations  GASTROINTESTINAL: No pain. No nausea or vomiting; No diarrhea   NEUROLOGICAL: No headache or numbness, no tremors  MUSCULOSKELETAL: No joint pain, no muscle pain  GENITOURINARY: no dysuria, no frequency, no hesitancy  PSYCHIATRY: no depression , no anxiety  ALL OTHER  ROS negative        Vital Signs Last 24 Hrs  T(C): 36.9 (13 May 2022 12:25), Max: 36.9 (13 May 2022 04:59)  T(F): 98.4 (13 May 2022 12:25), Max: 98.4 (13 May 2022 04:59)  HR: 62 (13 May 2022 12:25) (59 - 74)  BP: 134/55 (13 May 2022 12:25) (134/55 - 151/72)  BP(mean): 88 (13 May 2022 09:10) (76 - 88)  RR: 17 (13 May 2022 12:25) (16 - 18)  SpO2: 99% (13 May 2022 12:25) (99% - 100%)    ________________________________________________  PHYSICAL EXAM:  GENERAL: NAD  HEENT: Normocephalic;  conjunctivae and sclerae clear; moist mucous membranes;   NECK : supple  CHEST/LUNG: Clear to auscultation bilaterally with good air entry   HEART: S1 S2  regular; no murmurs, gallops or rubs  ABDOMEN: Soft, Nontender, Nondistended; Bowel sounds present  EXTREMITIES: no cyanosis; no edema; no calf tenderness  SKIN: warm and dry; no rash  NERVOUS SYSTEM:  Awake and alert; Oriented  to place, person and time ; no new deficits    _________________________________________________  LABS:                        12.5   4.58  )-----------( 434      ( 13 May 2022 06:33 )             37.0     05-13    138  |  103  |  33<H>  ----------------------------<  88  3.8   |  30  |  1.20    Ca    9.3      13 May 2022 06:33  Phos  3.7     05-12  Mg     1.8     05-12    TPro  8.2  /  Alb  2.9<L>  /  TBili  0.6  /  DBili  x   /  AST  15  /  ALT  18  /  AlkPhos  80  05-13        CAPILLARY BLOOD GLUCOSE            RADIOLOGY & ADDITIONAL TESTS:    Imaging  Reviewed:  YES/NO    Consultant(s) Notes Reviewed:   YES/ No      Plan of care was discussed with patient and /or primary care giver; all questions and concerns were addressed

## 2022-05-13 NOTE — PHYSICAL THERAPY INITIAL EVALUATION ADULT - IMPAIRMENTS FOUND, PT EVAL
aerobic capacity/endurance/gait, locomotion, and balance/gross motor/joint integrity and mobility/muscle strength

## 2022-05-14 PROCEDURE — 99232 SBSQ HOSP IP/OBS MODERATE 35: CPT

## 2022-05-14 PROCEDURE — 99231 SBSQ HOSP IP/OBS SF/LOW 25: CPT

## 2022-05-14 RX ORDER — ACETAMINOPHEN 500 MG
1000 TABLET ORAL EVERY 8 HOURS
Refills: 0 | Status: COMPLETED | OUTPATIENT
Start: 2022-05-15 | End: 2022-05-17

## 2022-05-14 RX ORDER — OXYCODONE HYDROCHLORIDE 5 MG/1
2.5 TABLET ORAL EVERY 6 HOURS
Refills: 0 | Status: DISCONTINUED | OUTPATIENT
Start: 2022-05-14 | End: 2022-05-17

## 2022-05-14 RX ORDER — POLYETHYLENE GLYCOL 3350 17 G/17G
17 POWDER, FOR SOLUTION ORAL
Refills: 0 | Status: DISCONTINUED | OUTPATIENT
Start: 2022-05-14 | End: 2022-05-17

## 2022-05-14 RX ADMIN — SENNA PLUS 2 TABLET(S): 8.6 TABLET ORAL at 22:07

## 2022-05-14 RX ADMIN — Medication 1 TABLET(S): at 05:05

## 2022-05-14 RX ADMIN — OXYCODONE HYDROCHLORIDE 2.5 MILLIGRAM(S): 5 TABLET ORAL at 19:36

## 2022-05-14 RX ADMIN — Medication 15 MILLIGRAM(S): at 22:06

## 2022-05-14 RX ADMIN — LIDOCAINE 2 PATCH: 4 CREAM TOPICAL at 18:19

## 2022-05-14 RX ADMIN — LIDOCAINE 2 PATCH: 4 CREAM TOPICAL at 11:21

## 2022-05-14 RX ADMIN — POLYETHYLENE GLYCOL 3350 17 GRAM(S): 17 POWDER, FOR SOLUTION ORAL at 17:40

## 2022-05-14 RX ADMIN — ENOXAPARIN SODIUM 40 MILLIGRAM(S): 100 INJECTION SUBCUTANEOUS at 22:15

## 2022-05-14 RX ADMIN — ATORVASTATIN CALCIUM 20 MILLIGRAM(S): 80 TABLET, FILM COATED ORAL at 22:06

## 2022-05-14 RX ADMIN — Medication 15 MILLIGRAM(S): at 12:18

## 2022-05-14 RX ADMIN — Medication 1000 MILLIGRAM(S): at 23:37

## 2022-05-14 RX ADMIN — PANTOPRAZOLE SODIUM 40 MILLIGRAM(S): 20 TABLET, DELAYED RELEASE ORAL at 06:24

## 2022-05-14 RX ADMIN — Medication 15 MILLIGRAM(S): at 06:15

## 2022-05-14 RX ADMIN — Medication 5 MILLIGRAM(S): at 22:06

## 2022-05-14 RX ADMIN — Medication 1000 MILLIGRAM(S): at 22:07

## 2022-05-14 RX ADMIN — Medication 75 MICROGRAM(S): at 05:05

## 2022-05-14 RX ADMIN — Medication 15 MILLIGRAM(S): at 05:04

## 2022-05-14 RX ADMIN — Medication 1000 MILLIGRAM(S): at 01:00

## 2022-05-14 RX ADMIN — Medication 15 MILLIGRAM(S): at 23:37

## 2022-05-14 RX ADMIN — OXYCODONE HYDROCHLORIDE 2.5 MILLIGRAM(S): 5 TABLET ORAL at 18:50

## 2022-05-14 RX ADMIN — LIDOCAINE 2 PATCH: 4 CREAM TOPICAL at 00:00

## 2022-05-14 RX ADMIN — Medication 15 MILLIGRAM(S): at 12:03

## 2022-05-14 RX ADMIN — Medication 1000 MILLIGRAM(S): at 07:24

## 2022-05-14 RX ADMIN — Medication 1000 MILLIGRAM(S): at 06:24

## 2022-05-14 RX ADMIN — POLYETHYLENE GLYCOL 3350 17 GRAM(S): 17 POWDER, FOR SOLUTION ORAL at 11:21

## 2022-05-14 RX ADMIN — LIDOCAINE 2 PATCH: 4 CREAM TOPICAL at 23:37

## 2022-05-14 NOTE — PROGRESS NOTE ADULT - ASSESSMENT
80 female osteoporosis, HTN, HLD, presenting with worsening lower back pain and was having trouble to walk. Admitted for ambulatory dysfunction, acute on chronic lower back pain, proximal LE pain.    #Ambulatory dysfunction  #Acute on chronic lower back pain  #Proximal LE pain  #osteoporosis  #HTN, HLD  Worsening lower back pain for past few weeks and now having difficulty with ambulation due to pain. Denies any falls or trauma or weakness. Able to urinate and have BM. Denies fever, chills, CP, SOB, abdominal pain, dysuria, LOC. CT LE neg  - Pain control: tylenol, toradol, oxycodone, lidocaine patch  - f/u Pain management  - PT - MICHAEL - f/u CM/SW  - Continue home osteoporosis, HTN, HLD meds  - DVT ppx  - Increased synthroid 75mcg for elevated TSH.

## 2022-05-14 NOTE — PROGRESS NOTE ADULT - SUBJECTIVE AND OBJECTIVE BOX
Source of information: POOL MOMIN, Chart review  Patient language: English  : n/a    HPI:  Pt is a 81 yo F lives alone, ambulates with a cane with PMH of Osteoporosis, HTN, HLD presents to the ED with complaints of lower back and leg pain for the last 13 days. Pt has been having worsening lower back pain and bilateral leg pain wherein for the last few days she is unable to walk. No c/o focal deficits, chest pain, shortness of breath, fever, headache, N/V, abdominal pain, urinary complaints. No recent travel/sickness/ change in meds.   (11 May 2022 16:05)    Pt is admitted for acute low back pain. CT lumbar demonstrates: No acute fracture or traumatic subluxation. Multilevel degenerative changes.   Pain consulted for low back pain. . Reports low back pain and b/l leg pain which started 2 weeks ago. Denies falls/ trauma. Pt seen and examined at bedside. Sitting in chair, reports low back and leg pain has improved since admission. Pt unable to rate pain score, however reports pain is mild. Denies numbness/ tingling, bladder/ bowel incontinence. Pain is alleviated by rest and pain medication, and exacerbated by movement. Pt reports she was able to take a shower today. Pt tolerating PO diet. Denies lethargy, chest pain, SOB, abdominal pain, nausea, vomiting. Reports constipation, last BM 5/11. Patient stated goal for pain control: to be able to take deep breaths, get out of bed to chair and ambulate with tolerable pain control. Pt does not use assistive devices for ambulation at baseline. Pt ambulated with PT 5/13. Pt reports taking Tylenol for arthritis for pain at home.     PAST MEDICAL & SURGICAL HISTORY:  Primary osteoarthritis of right knee      Osteoporosis, unspecified osteoporosis type, unspecified pathological fracture presence      Hyperlipidemia, unspecified hyperlipidemia type      Essential hypertension      No significant past surgical history          FAMILY HISTORY:  Family history of asthma in sister (Sibling)        Social History:   [X ] Denies ETOH use, illicit drug use and smoking    Allergies    No Known Allergies    MEDICATIONS  (STANDING):  acetaminophen     Tablet .. 1000 milliGRAM(s) Oral every 8 hours  atorvastatin 20 milliGRAM(s) Oral at bedtime  bisacodyl 5 milliGRAM(s) Oral at bedtime  enoxaparin Injectable 40 milliGRAM(s) SubCutaneous every 24 hours  ketorolac   Injectable 15 milliGRAM(s) IV Push every 8 hours  levothyroxine 75 MICROGram(s) Oral daily  lidocaine   4% Patch 2 Patch Transdermal daily  pantoprazole    Tablet 40 milliGRAM(s) Oral before breakfast  polyethylene glycol 3350 17 Gram(s) Oral two times a day  senna 2 Tablet(s) Oral at bedtime  triamterene 37.5 mG/hydrochlorothiazide 25 mG Tablet 1 Tablet(s) Oral daily    MEDICATIONS  (PRN):  oxyCODONE    IR 5 milliGRAM(s) Oral every 6 hours PRN Severe Pain (7 - 10)      Vital Signs Last 24 Hrs  T(C): 36.1 (14 May 2022 05:34), Max: 36.9 (13 May 2022 12:25)  T(F): 97 (14 May 2022 05:34), Max: 98.4 (13 May 2022 12:25)  HR: 61 (14 May 2022 05:34) (61 - 66)  BP: 142/63 (14 May 2022 05:34) (115/41 - 142/63)  BP(mean): --  RR: 18 (14 May 2022 05:34) (16 - 18)  SpO2: 99% (14 May 2022 05:34) (98% - 99%)  COVID-19 PCR: NotDetec (11 May 2022 14:53)    LABS: Reviewed                          12.5   4.58  )-----------( 434      ( 13 May 2022 06:33 )             37.0     05-13    138  |  103  |  33<H>  ----------------------------<  88  3.8   |  30  |  1.20    Ca    9.3      13 May 2022 06:33    TPro  8.2  /  Alb  2.9<L>  /  TBili  0.6  /  DBili  x   /  AST  15  /  ALT  18  /  AlkPhos  80  05-13      LIVER FUNCTIONS - ( 13 May 2022 06:33 )  Alb: 2.9 g/dL / Pro: 8.2 g/dL / ALK PHOS: 80 U/L / ALT: 18 U/L DA / AST: 15 U/L / GGT: x           COVID-19 PCR: NotDetec (11 May 2022 14:53)    Radiology: Reviewed.   < from: CT Lumbar Spine No Cont (05.11.22 @ 15:06) >    ACC: [id number] EXAM:  CT LUMBAR SPINE                          PROCEDURE DATE:  05/11/2022          INTERPRETATION:  Noncontrast CT examination of the lumbar spine    CLINICAL INDICATION: Back pain    TECHNIQUE:  Direct axial CT scanning of the lumbar spine was obtained   without the administration of intravenous contrast.  Sagittal and coronal   reformats were provided. Three-dimensional reconstructions of the liver   spine were created by the radiology technologist.    COMPARISON: None available    FINDINGS:    No acute fracture or traumatic subluxation. Vertebral body height and   facet alignment are maintained.    Grade 1 retrolisthesis of L2 on L3. Multilevel disc space narrowing.   Lumbar lordosis is maintained.    T12-L1: No spinal canal stenosis or neural foraminal narrowing.    L1-L2:  No spinal canal stenosis or neural foraminal narrowing.    L2-L3: Broad-based disc protrusion asymmetric to the right. Mild spinal   canal stenosis. Moderate right neural foraminal narrowing.    L3-L4: Disc bulge and bilateral facet/ligamentous hypertrophy. Mild   spinal canal stenosis. Mild bilateral neural foraminal narrowing.    L4-L5: Disc bulge and bilateral facet/ligamentous hypertrophy. Mild   spinal canal stenosis. Mild bilateral neural foraminal narrowing.    L5-S1: Disc bulge and bilateral facet hypertrophy. Mild spinal canal   stenosis. Moderate left and mild right neural foraminal narrowing.      IMPRESSION:    No acute fracture or traumatic subluxation.  Multilevel degenerative changes    --- End of Report ---            PAWAN GUZMAN MD; Attending Radiologist  This document has been electronically signed. May 11 2022  3:19PM    < end of copied text >      ORT Score -   Family Hx of substance abuse	Female	      Male  Alcohol 	                                           1                     3  Illegal drugs	                                   2                     3  Rx drugs                                           4 	                  4  Personal Hx of substance abuse		  Alcohol 	                                          3	                  3  Illegal drugs                                     4	                  4  Rx drugs                                            5 	                  5  Age between 16- 45 years	           1                     1  hx preadolescent sexual abuse	   3 	                  0  Psychological disease		  ADD, OCD, bipolar, schizophrenia   2	          2  Depression                                           1 	          1  Total: 0    a score of 3 or lower indicates low risk for opioid abuse		  a score of 4-7 indicates moderate risk for opioid abuse		  a score of 8 or higher indicates high risk for opioid abuse    REVIEW OF SYSTEMS:  CONSTITUTIONAL: No fever or fatigue  HEENT:  + difficulty hearing, no change in vision  NECK: No pain or stiffness  RESPIRATORY: No cough, wheezing, chills or hemoptysis; No shortness of breath  CARDIOVASCULAR: No chest pain, palpitations, dizziness, or leg swelling  GASTROINTESTINAL: No loss of appetite, decreased PO intake. No abdominal or epigastric pain. No nausea, vomiting; No diarrhea + constipation.   GENITOURINARY: No dysuria, frequency, hematuria, retention or incontinence  MUSCULOSKELETAL: No joint swelling; + lumbar back and b/l leg pain; no upper motor strength weakness, + lower motor strength weakness, no saddle anesthesia, bowel/bladder incontinence, no falls   NEURO: No headaches, No numbness/tingling b/l LE, No weakness    PHYSICAL EXAM:  GENERAL:  Alert & Oriented X4, calm, cooperative, NAD, Good concentration. Speech is clear.   RESPIRATORY: Respirations even and unlabored. Clear to auscultation bilaterally; No rales, rhonchi, wheezing, or rubs  CARDIOVASCULAR: Normal S1/S2, regular rate and rhythm; No murmurs, rubs, or gallops. No JVD.   GASTROINTESTINAL:  Soft, Nontender, Nondistended; Bowel sounds present  PERIPHERAL VASCULAR:  Extremities warm without edema. 2+ Peripheral Pulses, No cyanosis, No calf tenderness  MUSCULOSKELETAL: Motor Strength 4/5 B/L upper and lower extremities; moves all extremities equally against gravity; ROM intact; negative SLR; + lumbar back and b/l leg tenderness on palpation  SKIN: + right knee well approximated healed surgical scar; Warm, dry, intact. No rashes, lesions, or wounds.     Risk factors associated with adverse outcomes related to opioid treatment  [ ]  Concurrent benzodiazepine use  [ ]  History/ Active substance use or alcohol use disorder  [ ] Psychiatric co-morbidity  [ ] Sleep apnea  [ ] COPD  [ ] BMI> 35  [ ] Liver dysfunction  [ ] Renal dysfunction  [ ] CHF  [ ] Smoker  [X ]  Age > 60 years    [ X]  [place]  Reviewed and Copied to Chart. See below.    Plan of care and goal oriented pain management treatment options were discussed with patient and /or primary care giver; all questions and concerns were addressed and care was aligned with patient's wishes.    Educated patient on goal oriented pain management treatment options     05-14-22 @ 11:41

## 2022-05-14 NOTE — PROGRESS NOTE ADULT - SUBJECTIVE AND OBJECTIVE BOX
[facility] Medicine  Patient is a 81y old  Female who presents with a chief complaint of Ambulatory dysfunction (14 May 2022 11:38)      SUBJECTIVE / OVERNIGHT EVENTS:  No acute events over night. Reports feeling well and understands plan for MICHAEL placement. Denies any fevers/chills, headache, CP, SOB, abd pain, N/V/D, constipation, or leg swelling.       MEDICATIONS  (STANDING):  acetaminophen     Tablet .. 1000 milliGRAM(s) Oral every 8 hours  atorvastatin 20 milliGRAM(s) Oral at bedtime  bisacodyl 5 milliGRAM(s) Oral at bedtime  enoxaparin Injectable 40 milliGRAM(s) SubCutaneous every 24 hours  ketorolac   Injectable 15 milliGRAM(s) IV Push every 8 hours  levothyroxine 75 MICROGram(s) Oral daily  lidocaine   4% Patch 2 Patch Transdermal daily  pantoprazole    Tablet 40 milliGRAM(s) Oral before breakfast  polyethylene glycol 3350 17 Gram(s) Oral two times a day  senna 2 Tablet(s) Oral at bedtime  triamterene 37.5 mG/hydrochlorothiazide 25 mG Tablet 1 Tablet(s) Oral daily    MEDICATIONS  (PRN):  oxyCODONE    IR 2.5 milliGRAM(s) Oral every 6 hours PRN Severe Pain (7 - 10)          OBJECTIVE:  Vital Signs Last 24 Hrs  T(C): 36.6 (14 May 2022 13:50), Max: 36.6 (14 May 2022 13:50)  T(F): 97.9 (14 May 2022 13:50), Max: 97.9 (14 May 2022 13:50)  HR: 58 (14 May 2022 13:50) (58 - 66)  BP: 122/98 (14 May 2022 13:50) (115/41 - 142/63)  BP(mean): --  RR: 18 (14 May 2022 13:50) (16 - 18)  SpO2: 100% (14 May 2022 13:50) (98% - 100%)    PHYSICAL EXAM:  GENERAL: NAD  HEENT: Normocephalic;  conjunctivae and sclerae clear; moist mucous membranes;   NECK : supple  CHEST/LUNG: Clear to auscultation bilaterally with good air entry   HEART: S1 S2  regular; no murmurs, gallops or rubs  ABDOMEN: Soft, Nontender, Nondistended; Bowel sounds present  EXTREMITIES: no cyanosis; no edema; no calf tenderness  SKIN: warm and dry; no rash  NERVOUS SYSTEM:  Awake and alert; Oriented  to place, person and time ; no new deficits    CAPILLARY BLOOD GLUCOSE        I&O's Summary            LABS:                        12.5   4.58  )-----------( 434      ( 13 May 2022 06:33 )             37.0     05-13    138  |  103  |  33<H>  ----------------------------<  88  3.8   |  30  |  1.20    Ca    9.3      13 May 2022 06:33    TPro  8.2  /  Alb  2.9<L>  /  TBili  0.6  /  DBili  x   /  AST  15  /  ALT  18  /  AlkPhos  80  05-13                  RADIOLOGY & ADDITIONAL TESTS:

## 2022-05-14 NOTE — PROGRESS NOTE ADULT - ASSESSMENT
Confidential Drug Utilization Report  Search Terms: Linda Cazares, 1940Search Date: 05/12/2022 09:28:24 AM  The Drug Utilization Report below displays all of the controlled substance prescriptions, if any, that your patient has filled in the last twelve months. The information displayed on this report is compiled from pharmacy submissions to the Department, and accurately reflects the information as submitted by the pharmacies.    This report was requested by: Alba Manning | Reference #: [id number]    There are no results for the search terms that you entered.

## 2022-05-14 NOTE — PROGRESS NOTE ADULT - PROBLEM SELECTOR PLAN 1
Pt with acute exacerbation of lumbar back pain radiation to b/l legs which is somatic in nature. CT lumbar without acute fx. + degenerative changes, multiple disc bulges and disc narrowing.  + constipation   High risk medications reviewed. Avoid polypharmacy. Avoid IV opioids. Avoid benzodiazepines. Non-pharmacological sleep aides initiated. Non-opioid medications and non-pharmacological pain management measures initiated.   Opioid pain recommendations   - Decrease Oxycodone 2.5 mg PO q 6 hours PRN severe pain.   Non-opioid pain recommendations   - Toradol 15mg IVP q 8 hours x 3 days with PPI then naproxen 250mg PO q8h PRN moderate pain 5/15  - Renewed Acetaminophen 1 gram PO q 8 hours x 2 more days. Monitor LFTs  - Lidoderm 4% 2 patches daily.   Bowel Regimen  - Increased Miralax 17G PO BID   - Senna 2 tablets at bedtime for constipation  - Dulcolax 5mg PO at bedtime x 2 days  Mild pain   - Non-pharmacological pain treatment recommendations  - Warm/ Cool packs PRN   - Repositioning, imagery, relaxation, distraction.  - Physical therapy OOB if no contraindications   Recommendations discussed with primary team and RN.  Upon discharge recommend Tylenol 1G PO q8h PRN moderate pain and naproxen 250mg PO q8h PRN severe pain x 3 days with PPI. May follow up with Dr. [facility] [phone].

## 2022-05-15 PROCEDURE — 99232 SBSQ HOSP IP/OBS MODERATE 35: CPT

## 2022-05-15 RX ORDER — MULTIVIT WITH MIN/MFOLATE/K2 340-15/3 G
1 POWDER (GRAM) ORAL ONCE
Refills: 0 | Status: DISCONTINUED | OUTPATIENT
Start: 2022-05-15 | End: 2022-05-17

## 2022-05-15 RX ADMIN — Medication 5 MILLIGRAM(S): at 21:30

## 2022-05-15 RX ADMIN — PANTOPRAZOLE SODIUM 40 MILLIGRAM(S): 20 TABLET, DELAYED RELEASE ORAL at 05:21

## 2022-05-15 RX ADMIN — Medication 1 TABLET(S): at 05:21

## 2022-05-15 RX ADMIN — Medication 1000 MILLIGRAM(S): at 05:20

## 2022-05-15 RX ADMIN — Medication 1 ENEMA: at 10:42

## 2022-05-15 RX ADMIN — Medication 15 MILLIGRAM(S): at 06:08

## 2022-05-15 RX ADMIN — Medication 15 MILLIGRAM(S): at 06:44

## 2022-05-15 RX ADMIN — Medication 1000 MILLIGRAM(S): at 14:34

## 2022-05-15 RX ADMIN — Medication 75 MICROGRAM(S): at 05:20

## 2022-05-15 RX ADMIN — Medication 1000 MILLIGRAM(S): at 05:44

## 2022-05-15 RX ADMIN — Medication 1000 MILLIGRAM(S): at 21:30

## 2022-05-15 RX ADMIN — LIDOCAINE 2 PATCH: 4 CREAM TOPICAL at 19:20

## 2022-05-15 RX ADMIN — ATORVASTATIN CALCIUM 20 MILLIGRAM(S): 80 TABLET, FILM COATED ORAL at 21:30

## 2022-05-15 RX ADMIN — Medication 1000 MILLIGRAM(S): at 15:34

## 2022-05-15 RX ADMIN — OXYCODONE HYDROCHLORIDE 2.5 MILLIGRAM(S): 5 TABLET ORAL at 19:35

## 2022-05-15 RX ADMIN — Medication 1000 MILLIGRAM(S): at 22:01

## 2022-05-15 RX ADMIN — POLYETHYLENE GLYCOL 3350 17 GRAM(S): 17 POWDER, FOR SOLUTION ORAL at 17:12

## 2022-05-15 RX ADMIN — POLYETHYLENE GLYCOL 3350 17 GRAM(S): 17 POWDER, FOR SOLUTION ORAL at 05:22

## 2022-05-15 RX ADMIN — LIDOCAINE 2 PATCH: 4 CREAM TOPICAL at 12:30

## 2022-05-15 RX ADMIN — OXYCODONE HYDROCHLORIDE 2.5 MILLIGRAM(S): 5 TABLET ORAL at 20:55

## 2022-05-15 RX ADMIN — SENNA PLUS 2 TABLET(S): 8.6 TABLET ORAL at 21:30

## 2022-05-15 RX ADMIN — ENOXAPARIN SODIUM 40 MILLIGRAM(S): 100 INJECTION SUBCUTANEOUS at 23:33

## 2022-05-15 NOTE — PROGRESS NOTE ADULT - SUBJECTIVE AND OBJECTIVE BOX
[facility] Medicine  Patient is a 81y old  Female who presents with a chief complaint of Ambulatory dysfunction (14 May 2022 11:38)      SUBJECTIVE / OVERNIGHT EVENTS:  No acute events over night. Reports feeling well and understands plan for MICHAEL placement. Denies any fevers/chills, headache, CP, SOB, abd pain, N/V/D, constipation, or leg swelling.       MEDICATIONS  (STANDING):  acetaminophen     Tablet .. 1000 milliGRAM(s) Oral every 8 hours  atorvastatin 20 milliGRAM(s) Oral at bedtime  bisacodyl 5 milliGRAM(s) Oral at bedtime  enoxaparin Injectable 40 milliGRAM(s) SubCutaneous every 24 hours  ketorolac   Injectable 15 milliGRAM(s) IV Push every 8 hours  levothyroxine 75 MICROGram(s) Oral daily  lidocaine   4% Patch 2 Patch Transdermal daily  pantoprazole    Tablet 40 milliGRAM(s) Oral before breakfast  polyethylene glycol 3350 17 Gram(s) Oral two times a day  senna 2 Tablet(s) Oral at bedtime  triamterene 37.5 mG/hydrochlorothiazide 25 mG Tablet 1 Tablet(s) Oral daily    MEDICATIONS  (PRN):  oxyCODONE    IR 2.5 milliGRAM(s) Oral every 6 hours PRN Severe Pain (7 - 10)          OBJECTIVE:  Vital Signs Last 24 Hrs  T(C): 36.6 (14 May 2022 13:50), Max: 36.6 (14 May 2022 13:50)  T(F): 97.9 (14 May 2022 13:50), Max: 97.9 (14 May 2022 13:50)  HR: 58 (14 May 2022 13:50) (58 - 66)  BP: 122/98 (14 May 2022 13:50) (115/41 - 142/63)  BP(mean): --  RR: 18 (14 May 2022 13:50) (16 - 18)  SpO2: 100% (14 May 2022 13:50) (98% - 100%)    PHYSICAL EXAM:  GENERAL: NAD  HEENT: Normocephalic;  conjunctivae and sclerae clear; moist mucous membranes;   NECK : supple  CHEST/LUNG: Clear to auscultation bilaterally with good air entry   HEART: S1 S2  regular; no murmurs, gallops or rubs  ABDOMEN: Soft, Nontender, Nondistended; Bowel sounds present  EXTREMITIES: no cyanosis; no edema; no calf tenderness  SKIN: warm and dry; no rash  NERVOUS SYSTEM:  Awake and alert; Oriented  to place, person and time ; no new deficits    CAPILLARY BLOOD GLUCOSE        I&O's Summary            LABS:                        12.5   4.58  )-----------( 434      ( 13 May 2022 06:33 )             37.0     05-13    138  |  103  |  33<H>  ----------------------------<  88  3.8   |  30  |  1.20    Ca    9.3      13 May 2022 06:33    TPro  8.2  /  Alb  2.9<L>  /  TBili  0.6  /  DBili  x   /  AST  15  /  ALT  18  /  AlkPhos  80  05-13                  RADIOLOGY & ADDITIONAL TESTS:

## 2022-05-16 LAB — SARS-COV-2 RNA SPEC QL NAA+PROBE: SIGNIFICANT CHANGE UP

## 2022-05-16 PROCEDURE — 99232 SBSQ HOSP IP/OBS MODERATE 35: CPT

## 2022-05-16 RX ADMIN — OXYCODONE HYDROCHLORIDE 2.5 MILLIGRAM(S): 5 TABLET ORAL at 17:06

## 2022-05-16 RX ADMIN — SENNA PLUS 2 TABLET(S): 8.6 TABLET ORAL at 23:04

## 2022-05-16 RX ADMIN — ATORVASTATIN CALCIUM 20 MILLIGRAM(S): 80 TABLET, FILM COATED ORAL at 23:03

## 2022-05-16 RX ADMIN — OXYCODONE HYDROCHLORIDE 2.5 MILLIGRAM(S): 5 TABLET ORAL at 11:13

## 2022-05-16 RX ADMIN — LIDOCAINE 2 PATCH: 4 CREAM TOPICAL at 00:56

## 2022-05-16 RX ADMIN — LIDOCAINE 2 PATCH: 4 CREAM TOPICAL at 11:14

## 2022-05-16 RX ADMIN — Medication 75 MICROGRAM(S): at 05:22

## 2022-05-16 RX ADMIN — OXYCODONE HYDROCHLORIDE 2.5 MILLIGRAM(S): 5 TABLET ORAL at 23:10

## 2022-05-16 RX ADMIN — OXYCODONE HYDROCHLORIDE 2.5 MILLIGRAM(S): 5 TABLET ORAL at 18:00

## 2022-05-16 RX ADMIN — POLYETHYLENE GLYCOL 3350 17 GRAM(S): 17 POWDER, FOR SOLUTION ORAL at 17:06

## 2022-05-16 RX ADMIN — LIDOCAINE 2 PATCH: 4 CREAM TOPICAL at 23:00

## 2022-05-16 RX ADMIN — Medication 1000 MILLIGRAM(S): at 15:10

## 2022-05-16 RX ADMIN — Medication 1000 MILLIGRAM(S): at 07:22

## 2022-05-16 RX ADMIN — Medication 1000 MILLIGRAM(S): at 05:22

## 2022-05-16 RX ADMIN — LIDOCAINE 2 PATCH: 4 CREAM TOPICAL at 20:00

## 2022-05-16 RX ADMIN — Medication 1000 MILLIGRAM(S): at 14:19

## 2022-05-16 RX ADMIN — OXYCODONE HYDROCHLORIDE 2.5 MILLIGRAM(S): 5 TABLET ORAL at 12:05

## 2022-05-16 RX ADMIN — POLYETHYLENE GLYCOL 3350 17 GRAM(S): 17 POWDER, FOR SOLUTION ORAL at 05:23

## 2022-05-16 RX ADMIN — Medication 1 TABLET(S): at 05:22

## 2022-05-16 RX ADMIN — Medication 1000 MILLIGRAM(S): at 23:10

## 2022-05-16 RX ADMIN — ENOXAPARIN SODIUM 40 MILLIGRAM(S): 100 INJECTION SUBCUTANEOUS at 23:03

## 2022-05-16 NOTE — PROGRESS NOTE ADULT - ASSESSMENT
82 y/o female osteoporosis, HTN, HLD, now w lbp for 1 days which is mod to severe, was having trouble to walk. no trauma. Admitted for ambulatory dysfunction. CT LS resulted no fx, degenerative changes. pain management and PT consulted. PT recs MICHAEL.  Pt had elevated TSH 6.02, Synthroid increased to 75 mcg daily.     Pt is aox3, still reports bilateral hip and lower back pain, pain management following, pt is still awaiting placement for MICHAEL.

## 2022-05-16 NOTE — PROGRESS NOTE ADULT - NS ATTEND AMEND GEN_ALL_CORE FT
80 female osteoporosis, HTN, HLD, presenting with worsening lower back pain and was having trouble to walk. Admitted for ambulatory dysfunction, acute on chronic lower back pain, proximal LE pain.    #Ambulatory dysfunction  #Acute on chronic lower back pain  #Proximal LE pain  #osteoporosis  #HTN, HLD  Worsening lower back pain for past few weeks and now having difficulty with ambulation due to pain. Denies any falls or trauma or weakness. Able to urinate and have BM. Denies fever, chills, CP, SOB, abdominal pain, dysuria, LOC. CT LE neg  - Pain control: tylenol, toradol, oxycodone, lidocaine patch  - f/u Pain management  - PT - MICHAEL - f/u CM/SW  - Continue home osteoporosis, HTN, HLD meds  - DVT ppx  - Increased synthroid 75mcg for elevated TSH  - Medical stable - pending AUTH for MICHAEL
80 female osteoporosis, HTN, HLD, presenting with worsening lower back pain and was having trouble to walk. Admitted for ambulatory dysfunction, acute on chronic lower back pain, proximal LE pain.    #Ambulatory dysfunction  #Acute on chronic lower back pain  #Proximal LE pain  #osteoporosis  #HTN, HLD  Worsening lower back pain for past few weeks and now having difficulty with ambulation due to pain. Denies any falls or trauma or weakness. Able to urinate and have BM. Denies fever, chills, CP, SOB, abdominal pain, dysuria, LOC. CT LE neg  - Pain control: tylenol, toradol, oxycodone, lidocaine patch  - f/u Pain management  - PT for dispo  - Continue home osteoporosis, HTN, HLD meds  - DVT ppx  - SW consult for possible new home health/aid needs.  - Increase synthroid 75mcg for elevated TSH
80 female osteoporosis, HTN, HLD, presenting with worsening lower back pain and was having trouble to walk. Admitted for ambulatory dysfunction, acute on chronic lower back pain, proximal LE pain.    #Ambulatory dysfunction  #Acute on chronic lower back pain  #Proximal LE pain  #osteoporosis  #HTN, HLD  Worsening lower back pain for past few weeks and now having difficulty with ambulation due to pain. Denies any falls or trauma or weakness. Able to urinate and have BM. Denies fever, chills, CP, SOB, abdominal pain, dysuria, LOC. CT LE neg  - Pain control: tylenol, toradol, oxycodone, lidocaine patch  - f/u Pain management  - PT for dispo  - Continue home osteoporosis, HTN, HLD meds  - DVT ppx  - SW consult for possible new home health/aid needs

## 2022-05-16 NOTE — PROGRESS NOTE ADULT - PROBLEM SELECTOR PLAN 1
Pt presented with lower back and leg pain for 13 days  CT lumbar spine showed no acute fracture and multilevel degenerative changes  started on lidocaine patch, tramadol, percocet and tylenol PRNs for pain  fall risk precautions  Pain management consulted  Physical therapy recs MICHAEL

## 2022-05-16 NOTE — PROGRESS NOTE ADULT - PROBLEM SELECTOR PLAN 6
lives alone  SW consulted for safe discharge plan.   PT recs MICHAEL  Pain management assisting w/ optimal pain control Intoxication

## 2022-05-16 NOTE — PROGRESS NOTE ADULT - PROBLEM SELECTOR PLAN 3
Pt has h/o HTN  continue home med of triamterene/ HCTZ with parameters  ckd goal <140/90  monitor bp
Pt has h/o HTN  started home med of triamterene/ HCTZ with parameters
Pt has h/o HTN  started home med of triamterene/ HCTZ with parameters

## 2022-05-16 NOTE — PROGRESS NOTE ADULT - PROBLEM SELECTOR PLAN 4
Pt takes synthroid 50mcg at home  TSH 6.5- synthroid increased to 75 mcg  pt can follow up as outpatient for monitor TSH

## 2022-05-17 VITALS
TEMPERATURE: 98 F | RESPIRATION RATE: 16 BRPM | SYSTOLIC BLOOD PRESSURE: 153 MMHG | OXYGEN SATURATION: 98 % | HEART RATE: 68 BPM | DIASTOLIC BLOOD PRESSURE: 57 MMHG

## 2022-05-17 PROCEDURE — 87641 MR-STAPH DNA AMP PROBE: CPT

## 2022-05-17 PROCEDURE — 84100 ASSAY OF PHOSPHORUS: CPT

## 2022-05-17 PROCEDURE — 84439 ASSAY OF FREE THYROXINE: CPT

## 2022-05-17 PROCEDURE — 99239 HOSP IP/OBS DSCHRG MGMT >30: CPT

## 2022-05-17 PROCEDURE — 99231 SBSQ HOSP IP/OBS SF/LOW 25: CPT

## 2022-05-17 PROCEDURE — 83036 HEMOGLOBIN GLYCOSYLATED A1C: CPT

## 2022-05-17 PROCEDURE — 84443 ASSAY THYROID STIM HORMONE: CPT

## 2022-05-17 PROCEDURE — 85025 COMPLETE CBC W/AUTO DIFF WBC: CPT

## 2022-05-17 PROCEDURE — 83735 ASSAY OF MAGNESIUM: CPT

## 2022-05-17 PROCEDURE — 85027 COMPLETE CBC AUTOMATED: CPT

## 2022-05-17 PROCEDURE — 97162 PT EVAL MOD COMPLEX 30 MIN: CPT

## 2022-05-17 PROCEDURE — 99285 EMERGENCY DEPT VISIT HI MDM: CPT | Mod: 25

## 2022-05-17 PROCEDURE — 87635 SARS-COV-2 COVID-19 AMP PRB: CPT

## 2022-05-17 PROCEDURE — 80053 COMPREHEN METABOLIC PANEL: CPT

## 2022-05-17 PROCEDURE — 36415 COLL VENOUS BLD VENIPUNCTURE: CPT

## 2022-05-17 PROCEDURE — 87640 STAPH A DNA AMP PROBE: CPT

## 2022-05-17 PROCEDURE — 80061 LIPID PANEL: CPT

## 2022-05-17 PROCEDURE — 72131 CT LUMBAR SPINE W/O DYE: CPT | Mod: MA

## 2022-05-17 RX ORDER — SENNA PLUS 8.6 MG/1
2 TABLET ORAL
Qty: 0 | Refills: 0 | DISCHARGE
Start: 2022-05-17

## 2022-05-17 RX ORDER — OXYCODONE HYDROCHLORIDE 5 MG/1
2.5 TABLET ORAL
Qty: 0 | Refills: 0 | DISCHARGE
Start: 2022-05-17

## 2022-05-17 RX ORDER — ACETAMINOPHEN 500 MG
1000 TABLET ORAL EVERY 8 HOURS
Refills: 0 | Status: DISCONTINUED | OUTPATIENT
Start: 2022-05-17 | End: 2022-05-17

## 2022-05-17 RX ORDER — LIDOCAINE 4 G/100G
1 CREAM TOPICAL
Qty: 0 | Refills: 0 | DISCHARGE
Start: 2022-05-17

## 2022-05-17 RX ORDER — LEVOTHYROXINE SODIUM 125 MCG
1 TABLET ORAL
Qty: 0 | Refills: 0 | DISCHARGE

## 2022-05-17 RX ORDER — ACETAMINOPHEN 500 MG
650 TABLET ORAL EVERY 6 HOURS
Refills: 0 | Status: DISCONTINUED | OUTPATIENT
Start: 2022-05-17 | End: 2022-05-17

## 2022-05-17 RX ORDER — ACETAMINOPHEN 500 MG
2 TABLET ORAL
Qty: 0 | Refills: 0 | DISCHARGE
Start: 2022-05-17

## 2022-05-17 RX ORDER — LEVOTHYROXINE SODIUM 125 MCG
1 TABLET ORAL
Qty: 0 | Refills: 0 | DISCHARGE
Start: 2022-05-17

## 2022-05-17 RX ORDER — POLYETHYLENE GLYCOL 3350 17 G/17G
17 POWDER, FOR SOLUTION ORAL
Qty: 0 | Refills: 0 | DISCHARGE
Start: 2022-05-17

## 2022-05-17 RX ADMIN — Medication 75 MICROGRAM(S): at 06:30

## 2022-05-17 RX ADMIN — Medication 1000 MILLIGRAM(S): at 00:10

## 2022-05-17 RX ADMIN — OXYCODONE HYDROCHLORIDE 2.5 MILLIGRAM(S): 5 TABLET ORAL at 07:16

## 2022-05-17 RX ADMIN — Medication 1000 MILLIGRAM(S): at 06:30

## 2022-05-17 RX ADMIN — OXYCODONE HYDROCHLORIDE 2.5 MILLIGRAM(S): 5 TABLET ORAL at 06:30

## 2022-05-17 RX ADMIN — OXYCODONE HYDROCHLORIDE 2.5 MILLIGRAM(S): 5 TABLET ORAL at 12:24

## 2022-05-17 RX ADMIN — Medication 1000 MILLIGRAM(S): at 07:16

## 2022-05-17 RX ADMIN — POLYETHYLENE GLYCOL 3350 17 GRAM(S): 17 POWDER, FOR SOLUTION ORAL at 06:31

## 2022-05-17 RX ADMIN — OXYCODONE HYDROCHLORIDE 2.5 MILLIGRAM(S): 5 TABLET ORAL at 00:10

## 2022-05-17 RX ADMIN — Medication 1 TABLET(S): at 06:30

## 2022-05-17 RX ADMIN — LIDOCAINE 2 PATCH: 4 CREAM TOPICAL at 11:22

## 2022-05-17 NOTE — DISCHARGE NOTE PROVIDER - PROVIDER TOKENS
PROVIDER:[TOKEN:[[id number]],FOLLOWUP:[Routine]],PROVIDER:[TOKEN:[[id number]],FOLLOWUP:[Routine]]

## 2022-05-17 NOTE — DISCHARGE NOTE PROVIDER - CARE PROVIDER_API CALL
Yoly Cortez (DO)  Internal Medicine  [address] [facility], [address] [place] [address]  Phone: [phone]  Fax: [phone]  Follow Up Time: Routine    José Luis Hays  PAIN MEDICINE  [address] [place] [address]  Phone: [phone]  Fax: [phone]  Follow Up Time: Routine

## 2022-05-17 NOTE — DISCHARGE NOTE NURSING/CASE MANAGEMENT/SOCIAL WORK - PATIENT PORTAL LINK FT
You can access the FollowMyHealth Patient Portal offered by [facility] by registering at the following website: [email or web address]. By joining [facility]’s FollowMyHealth portal, you will also be able to view your health information using other applications (apps) compatible with our system.

## 2022-05-17 NOTE — DISCHARGE NOTE PROVIDER - NSDCMRMEDTOKEN_GEN_ALL_CORE_FT
acetaminophen 500 mg oral tablet: 2 tab(s) orally every 8 hours, As needed, Moderate Pain (4 - 6), until (5/21)  alendronate 70 mg oral tablet: 1 tab(s) orally once a week  atorvastatin 20 mg oral tablet: 1 tab(s) orally once a day  levothyroxine 75 mcg (0.075 mg) oral tablet: 1 tab(s) orally once a day  lidocaine 4% topical film: Apply topically to affected area once a day, As Needed  oxyCODONE: 2.5 milligram(s) orally every 6 hours, As Needed (until 5/21)  polyethylene glycol 3350 oral powder for reconstitution: 17 gram(s) orally 2 times a day  senna oral tablet: 2 tab(s) orally once a day (at bedtime)  triamterene-hydrochlorothiazide 37.5 mg-25 mg oral tablet: 1 tab(s) orally once a day

## 2022-05-17 NOTE — PROGRESS NOTE ADULT - PROVIDER SPECIALTY LIST ADULT
Internal Medicine
Internal Medicine
Pain Medicine
Pain Medicine
Internal Medicine
Pain Medicine
Internal Medicine
Internal Medicine

## 2022-05-17 NOTE — PROGRESS NOTE ADULT - PROBLEM SELECTOR PROBLEM 2
H/O osteoporosis
Pain of back and lower extremity
H/O osteoporosis
H/O osteoporosis

## 2022-05-17 NOTE — PROGRESS NOTE ADULT - ASSESSMENT
Confidential Drug Utilization Report  Search Terms: Linda Cazares, 1940Search Date: 05/12/2022 09:28:24 AM  The Drug Utilization Report below displays all of the controlled substance prescriptions, if any, that your patient has filled in the last twelve months. The information displayed on this report is compiled from pharmacy submissions to the Department, and accurately reflects the information as submitted by the pharmacies.    This report was requested by: Alba Manning | Reference #: [id number]    There are no results for the search terms that you entered.

## 2022-05-17 NOTE — DIETITIAN INITIAL EVALUATION ADULT - OTHER INFO
Pt  Visited. Pt Reports Fair appetite. Food  choices  Updated. Pt does  not eat Beef and Pork. Per Pt Ht 62 inches. Wt 134 lb.  PO tolerated.

## 2022-05-17 NOTE — DISCHARGE NOTE PROVIDER - ATTENDING DISCHARGE PHYSICAL EXAMINATION:
80 female osteoporosis, HTN, HLD, presenting with worsening lower back pain and was having trouble to walk. Admitted for ambulatory dysfunction, acute on chronic lower back pain, proximal LE pain, osteoporosis, and hypothyroidism. Worsening lower back pain for past few weeks and now having difficulty with ambulation due to pain. Denies any falls or trauma or weakness. Able to urinate and have BM. Pain controlled with medication. Increased home levothyroxine dose. PT recommended [facility]. Stable for discharge to [facility].    PHYSICAL EXAM:  GENERAL: thin appearing, NAD  EYES: clear conjunctiva;  ENMT: Moist mucous membranes  NECK: Supple, No JVD, Normal thyroid  CHEST/LUNG: Clear to auscultation bilaterally; No rales, rhonchi, wheezing, or rubs  HEART: S1, S2, Regular rate and rhythm  ABDOMEN: Soft, Nontender, Nondistended; Bowel sounds present  NEURO: Alert & Oriented X3  EXTREMITIES: No LE edema, no calf tenderness  LYMPH: No lymphadenopathy noted  SKIN: No rashes or lesions

## 2022-05-17 NOTE — PROGRESS NOTE ADULT - SUBJECTIVE AND OBJECTIVE BOX
Source of information: POOL MOMIN, Chart review  Patient language: English  : n/a    HPI:  Pt is a 79 yo F lives alone, ambulates with a cane with PMH of Osteoporosis, HTN, HLD presents to the ED with complaints of lower back and leg pain for the last 13 days. Pt has been having worsening lower back pain and bilateral leg pain wherein for the last few days she is unable to walk. No c/o focal deficits, chest pain, shortness of breath, fever, headache, N/V, abdominal pain, urinary complaints. No recent travel/sickness/ change in meds.   (11 May 2022 16:05)    Pt is admitted for acute low back pain. CT lumbar demonstrates: No acute fracture or traumatic subluxation. Multilevel degenerative changes.   Pain consulted for low back pain. Reports low back pain and b/l leg pain which started 2 weeks ago. Denies falls/ trauma. Pt seen and examined at bedside. Laying in bed, denies pain. Reports low back and leg pain has improved since admission. Denies numbness/ tingling, bladder/ bowel incontinence. Pain is alleviated by rest and pain medication, and exacerbated by movement. Pt tolerating PO diet. Denies lethargy, chest pain, SOB, abdominal pain, nausea, vomiting. Reports constipation resolved, had an enema a couple days ago, last BM 5/16. Patient stated goal for pain control: to be able to take deep breaths, get out of bed to chair and ambulate with tolerable pain control. Pt does not use assistive devices for ambulation at baseline. Pt ambulated with PT. Pt reports taking Tylenol for arthritis for pain at home. Plan for discharge to [facility].     PAST MEDICAL & SURGICAL HISTORY:  Primary osteoarthritis of right knee      Osteoporosis, unspecified osteoporosis type, unspecified pathological fracture presence      Hyperlipidemia, unspecified hyperlipidemia type      Essential hypertension      No significant past surgical history          FAMILY HISTORY:  Family history of asthma in sister (Sibling)        Social History:   [X ] Denies ETOH use, illicit drug use and smoking    Allergies    No Known Allergies    MEDICATIONS  (STANDING):  atorvastatin 20 milliGRAM(s) Oral at bedtime  enoxaparin Injectable 40 milliGRAM(s) SubCutaneous every 24 hours  levothyroxine 75 MICROGram(s) Oral daily  lidocaine   4% Patch 2 Patch Transdermal daily  magnesium citrate Oral Solution 1 Bottle Oral once  polyethylene glycol 3350 17 Gram(s) Oral two times a day  senna 2 Tablet(s) Oral at bedtime  triamterene 37.5 mG/hydrochlorothiazide 25 mG Tablet 1 Tablet(s) Oral daily    MEDICATIONS  (PRN):  acetaminophen     Tablet .. 650 milliGRAM(s) Oral every 6 hours PRN Moderate Pain (4 - 6)  oxyCODONE    IR 2.5 milliGRAM(s) Oral every 6 hours PRN Severe Pain (7 - 10)      Vital Signs Last 24 Hrs  T(C): 36.4 (17 May 2022 05:14), Max: 36.7 (16 May 2022 12:09)  T(F): 97.5 (17 May 2022 05:14), Max: 98 (16 May 2022 12:09)  HR: 57 (17 May 2022 05:59) (50 - 62)  BP: 165/51 (17 May 2022 05:59) (123/60 - 165/51)  BP(mean): --  RR: 17 (17 May 2022 05:14) (16 - 17)  SpO2: 99% (17 May 2022 05:14) (96% - 99%)  COVID-19 PCR: NotDetec (16 May 2022 11:30)  COVID-19 PCR: NotDetec (11 May 2022 14:53)    LABS: Reviewed    COVID-19 PCR: NotDetec (16 May 2022 11:30)  COVID-19 PCR: NotDetec (11 May 2022 14:53)    Radiology: Reviewed.   < from: CT Lumbar Spine No Cont (05.11.22 @ 15:06) >    ACC: [id number] EXAM:  CT LUMBAR SPINE                          PROCEDURE DATE:  05/11/2022          INTERPRETATION:  Noncontrast CT examination of the lumbar spine    CLINICAL INDICATION: Back pain    TECHNIQUE:  Direct axial CT scanning of the lumbar spine was obtained   without the administration of intravenous contrast.  Sagittal and coronal   reformats were provided. Three-dimensional reconstructions of the liver   spine were created by the radiology technologist.    COMPARISON: None available    FINDINGS:    No acute fracture or traumatic subluxation. Vertebral body height and   facet alignment are maintained.    Grade 1 retrolisthesis of L2 on L3. Multilevel disc space narrowing.   Lumbar lordosis is maintained.    T12-L1: No spinal canal stenosis or neural foraminal narrowing.    L1-L2:  No spinal canal stenosis or neural foraminal narrowing.    L2-L3: Broad-based disc protrusion asymmetric to the right. Mild spinal   canal stenosis. Moderate right neural foraminal narrowing.    L3-L4: Disc bulge and bilateral facet/ligamentous hypertrophy. Mild   spinal canal stenosis. Mild bilateral neural foraminal narrowing.    L4-L5: Disc bulge and bilateral facet/ligamentous hypertrophy. Mild   spinal canal stenosis. Mild bilateral neural foraminal narrowing.    L5-S1: Disc bulge and bilateral facet hypertrophy. Mild spinal canal   stenosis. Moderate left and mild right neural foraminal narrowing.      IMPRESSION:    No acute fracture or traumatic subluxation.  Multilevel degenerative changes    --- End of Report ---            PAWAN GUZMAN MD; Attending Radiologist  This document has been electronically signed. May 11 2022  3:19PM    < end of copied text >      ORT Score -   Family Hx of substance abuse	Female	      Male  Alcohol 	                                           1                     3  Illegal drugs	                                   2                     3  Rx drugs                                           4 	                  4  Personal Hx of substance abuse		  Alcohol 	                                          3	                  3  Illegal drugs                                     4	                  4  Rx drugs                                            5 	                  5  Age between 16- 45 years	           1                     1  hx preadolescent sexual abuse	   3 	                  0  Psychological disease		  ADD, OCD, bipolar, schizophrenia   2	          2  Depression                                           1 	          1  Total: 0    a score of 3 or lower indicates low risk for opioid abuse		  a score of 4-7 indicates moderate risk for opioid abuse		  a score of 8 or higher indicates high risk for opioid abuse    REVIEW OF SYSTEMS:  CONSTITUTIONAL: No fever or fatigue  HEENT:  + difficulty hearing, no change in vision  NECK: No pain or stiffness  RESPIRATORY: No cough, wheezing, chills or hemoptysis; No shortness of breath  CARDIOVASCULAR: No chest pain, palpitations, dizziness, or leg swelling  GASTROINTESTINAL: No loss of appetite, decreased PO intake. No abdominal or epigastric pain. No nausea, vomiting; No diarrhea + hx constipation.   GENITOURINARY: No dysuria, frequency, hematuria, retention or incontinence  MUSCULOSKELETAL: No joint swelling; Denies lumbar back and b/l leg pain at this time; no upper motor strength weakness, + lower motor strength weakness, no saddle anesthesia, bowel/bladder incontinence, no falls   NEURO: No headaches, No numbness/tingling b/l LE, No weakness    PHYSICAL EXAM:  GENERAL:  Alert & Oriented X4, calm, cooperative, NAD, Good concentration. Speech is clear.   RESPIRATORY: Respirations even and unlabored. Clear to auscultation bilaterally; No rales, rhonchi, wheezing, or rubs  CARDIOVASCULAR: Normal S1/S2, regular rate and rhythm; No murmurs, rubs, or gallops. No JVD.   GASTROINTESTINAL:  Soft, Nontender, Nondistended; Bowel sounds present  PERIPHERAL VASCULAR:  Extremities warm without edema. 2+ Peripheral Pulses, No cyanosis, No calf tenderness  MUSCULOSKELETAL: Motor Strength 4/5 B/L upper and lower extremities; moves all extremities equally against gravity; ROM intact; negative SLR; No back or leg tenderness at this time  SKIN: + right knee well approximated healed surgical scar; Warm, dry, intact. No rashes, lesions, or wounds.     Risk factors associated with adverse outcomes related to opioid treatment  [ ]  Concurrent benzodiazepine use  [ ]  History/ Active substance use or alcohol use disorder  [ ] Psychiatric co-morbidity  [ ] Sleep apnea  [ ] COPD  [ ] BMI> 35  [ ] Liver dysfunction  [ ] Renal dysfunction  [ ] CHF  [ ] Smoker  [X ]  Age > 60 years    [ X]  [place]  Reviewed and Copied to Chart. See below.    Plan of care and goal oriented pain management treatment options were discussed with patient and /or primary care giver; all questions and concerns were addressed and care was aligned with patient's wishes.    Educated patient on goal oriented pain management treatment options     05-17-22 @ 10:38

## 2022-05-17 NOTE — DISCHARGE NOTE NURSING/CASE MANAGEMENT/SOCIAL WORK - NSDCPEFALRISK_GEN_ALL_CORE
For information on Fall & Injury Prevention, visit: [email or web address] OR  [email or web address] OR  https://www.cdc.gov/steadi/patient.html

## 2022-05-17 NOTE — DISCHARGE NOTE PROVIDER - NSDCCPCAREPLAN_GEN_ALL_CORE_FT
PRINCIPAL DISCHARGE DIAGNOSIS  Diagnosis: Acute back pain  Assessment and Plan of Treatment: you had a CT scan of your spine that showed degenerative changes likely due to your history of osteoporosis  you were seen by a physical therapist and recommended you to go to rehab  you were also seen by pain management:  continue Tylenol 1G PO q8h PRN moderate pain and oxycodone 2.5mg PO q6h PRN severe pain x 3 days  continue bowel regimen with senna and miralax daily  May follow up with Dr[facility] [phone]      SECONDARY DISCHARGE DIAGNOSES  Diagnosis: Osteoarthritis, chronic  Assessment and Plan of Treatment: Osteoarthritis (OA) occurs when cartilage (tissue that cushions a joint) wears away slowly and causes the bones to rub together. OA is a long-term condition that often affects the hands, neck, lower back, knees, and hips. OA is also called arthrosis or degenerative joint disease.  Medicines: Acetaminophen decreases pain   Physical therapy may be used to teach you exercises to help improve movement and strength, and to decrease pain. A physical therapist may move an area with his or her hands. For example, he or she may move your leg in certain ways to treat osteoarthritis in your hip.    Diagnosis: Hypothyroidism  Assessment and Plan of Treatment: we increased your synthroid to 75 mcg daily due to elevated TSH of 6.5  follow up with your primary care Dr. Cortez    Diagnosis: Hypertension  Assessment and Plan of Treatment: your blood pressure ranged between: (123/60 - 165/51)  continue taking triamterene/ HCTZ daily  follow up with your primary care Dr. Cortez  Low salt diet  Activity as tolerated.  Take all medication as prescribed.  Follow up with your medical doctor for routine blood pressure monitoring at your next visit.  Notify your doctor if you have any of the following symptoms:   Dizziness, Lightheadedness, Blurry vision, Headache, Chest pain, Shortness of breath    Diagnosis: HLD (hyperlipidemia)  Assessment and Plan of Treatment: continue atorvastatin 20 mg daily  Hyperlipidemia is a high level of lipids (fats) in your blood. These lipids include cholesterol or triglycerides. Lipids are made by your body. They also come from the foods you eat. Your body needs lipids to work properly, but high levels increase your risk for heart disease, heart attack, and stroke.  Eat heart-healthy foods. A dietitian or your provider can give you more information on low-sodium plans or the DASH (Dietary Approaches to Stop Hypertension) eating plan. The DASH plan is low in sodium, processed sugar, unhealthy fats, and total fat. It is high in potassium, calcium, and fiber. It is high in potassium, calcium, and fiber. These can be found in vegetables, fruit, and whole-grain foods. The following are ways to get more heart-healthy foods:  Decrease the total amount of fat you eat. Choose lean meats, fat-free or 1% fat milk, and low-fat dairy products, such as yogurt and cheese. Limit or do not eat red meat. Red meats are high in fat and cholesterol.  Replace unhealthy fats with healthy fats. Unhealthy fats include saturated fat, trans fat, and cholesterol. Choose soft margarines that are low in saturated fat and have little or no trans fat. Monounsaturated fats are healthy fats. These are found in olive oil, canola oil, avocado, and nuts. Polyunsaturated fats are also healthy. These are found in fish, flaxseed, walnuts, and soybeans.  Eat 5 or more servings of fruits and vegetables every day. They are low in calories and fat, and a good source of essential vitamins. Include dark green, red, and orange vegetables. Examples include spinach, kale, broccoli, and carrots.  Eat foods high in fiber. Fiber can help lower your cholesterol levels. Choose whole grain, high-fiber foods. Good choices include whole-wheat breads or cereals, beans, peas, fruits, and vegetables.  Limit sodium (salt) as directed.

## 2022-05-17 NOTE — PROGRESS NOTE ADULT - PROBLEM SELECTOR PROBLEM 1
Acute back pain
Ambulatory dysfunction
Acute back pain
Acute back pain

## 2022-05-17 NOTE — DISCHARGE NOTE PROVIDER - HOSPITAL COURSE
82 y/o female osteoporosis, HTN, HLD, c/o lower back pain for 1 day with difficulty walking. Admitted for ambulatory dysfunction. CT LS resulted no fx, degenerative changes. pain management and PT consulted. PT recs MICHAEL.  Pt had elevated TSH 6.02, Synthroid increased to 75 mcg daily. bilateral hip and lower back pain now improved, pain management following, PT rec MICHAEL. covid negative 5/16.     upon discharge, continue lidocaine patches daily, Tylenol 1G PO q8h PRN moderate pain and oxycodone 2.5mg PO q6h PRN severe pain x 3 days until 5/21  for follow up with PCP and Pain Dr. Hays.     Given patient's improved clinical status and current hemodynamic stability, decision was made to discharge. Please note that this a brief summary of hospital course please refer to daily progress notes and consult notes for full course and events

## 2022-05-17 NOTE — PROGRESS NOTE ADULT - REASON FOR ADMISSION
Ambulatory dysfunction

## 2022-05-17 NOTE — PROGRESS NOTE ADULT - PROBLEM SELECTOR PLAN 1
Pt with acute exacerbation of lumbar back pain radiation to b/l legs which is somatic in nature. CT lumbar without acute fx. + degenerative changes, multiple disc bulges and disc narrowing.     High risk medications reviewed. Avoid polypharmacy. Avoid IV opioids. Avoid benzodiazepines. Non-pharmacological sleep aides initiated. Non-opioid medications and non-pharmacological pain management measures initiated.   Opioid pain recommendations   - Continue Oxycodone 2.5 mg PO q 6 hours PRN severe pain.   Non-opioid pain recommendations   - Completed Toradol 15mg IVP q 8 hours x 3 days with PPI followed by naproxen 250mg PO q8h PRN moderate pain   - Completed standing Acetaminophen 1 gram PO q 8 hours x 4 days. Monitor LFTs  - Tylenol 1G PO q8h PRN moderate pain.   - Lidoderm 4% 2 patches daily.   Bowel Regimen  - Increased Miralax 17G PO BID   - Senna 2 tablets at bedtime for constipation  - Dulcolax 5mg PO at bedtime x 2 days  Mild pain   - Non-pharmacological pain treatment recommendations  - Warm/ Cool packs PRN   - Repositioning, imagery, relaxation, distraction.  - Physical therapy OOB if no contraindications   Recommendations discussed with primary team and RN.  Upon discharge recommend Tylenol 1G PO q8h PRN moderate pain and oxycodone 2.5mg PO q6h PRN severe pain x 3 days with bowel regimen. May follow up with [facility] [phone].

## 2022-05-17 NOTE — DIETITIAN INITIAL EVALUATION ADULT - PERTINENT MEDS FT
MEDICATIONS  (STANDING):  atorvastatin 20 milliGRAM(s) Oral at bedtime  enoxaparin Injectable 40 milliGRAM(s) SubCutaneous every 24 hours  levothyroxine 75 MICROGram(s) Oral daily  lidocaine   4% Patch 2 Patch Transdermal daily  magnesium citrate Oral Solution 1 Bottle Oral once  polyethylene glycol 3350 17 Gram(s) Oral two times a day  senna 2 Tablet(s) Oral at bedtime  triamterene 37.5 mG/hydrochlorothiazide 25 mG Tablet 1 Tablet(s) Oral daily    MEDICATIONS  (PRN):  acetaminophen     Tablet .. 1000 milliGRAM(s) Oral every 8 hours PRN Moderate Pain (4 - 6)  oxyCODONE    IR 2.5 milliGRAM(s) Oral every 6 hours PRN Severe Pain (7 - 10)

## 2022-08-18 NOTE — PROGRESS NOTE ADULT - SUBJECTIVE AND OBJECTIVE BOX
Patient is a 81y old  Female who presents with a chief complaint of Ambulatory dysfunction (15 May 2022 12:04)    OVERNIGHT EVENTS: no acute changes     REVIEW OF SYSTEMS:  CONSTITUTIONAL: No fever, chills  ENMT:  No difficulty hearing, no change in vision  NECK: No pain or stiffness  RESPIRATORY: No cough, SOB  CARDIOVASCULAR: No chest pain, palpitations  GASTROINTESTINAL: No abdominal pain. No nausea, vomiting, or diarrhea  GENITOURINARY: No dysuria  NEUROLOGICAL: No HA  SKIN: No itching, burning, rashes, or lesions   LYMPH NODES: No enlarged glands  ENDOCRINE: No heat or cold intolerance; No hair loss  MUSCULOSKELETAL: +bilateral thigh pain and lower back pain. No joint pain or swelling  PSYCHIATRIC: No depression, anxiety  HEME/LYMPH: No easy bruising, or bleeding gums    T(C): 36.7 (05-16-22 @ 12:09), Max: 36.7 (05-15-22 @ 21:30)  HR: 61 (05-16-22 @ 12:09) (55 - 62)  BP: 123/60 (05-16-22 @ 12:09) (119/41 - 136/54)  RR: 17 (05-16-22 @ 12:09) (16 - 17)  SpO2: 96% (05-16-22 @ 12:09) (96% - 99%)  Wt(kg): --Vital Signs Last 24 Hrs  T(C): 36.7 (16 May 2022 12:09), Max: 36.7 (15 May 2022 21:30)  T(F): 98 (16 May 2022 12:09), Max: 98.1 (15 May 2022 21:30)  HR: 61 (16 May 2022 12:09) (55 - 62)  BP: 123/60 (16 May 2022 12:09) (119/41 - 136/54)  BP(mean): --  RR: 17 (16 May 2022 12:09) (16 - 17)  SpO2: 96% (16 May 2022 12:09) (96% - 99%)    MEDICATIONS  (STANDING):  acetaminophen     Tablet .. 1000 milliGRAM(s) Oral every 8 hours  atorvastatin 20 milliGRAM(s) Oral at bedtime  enoxaparin Injectable 40 milliGRAM(s) SubCutaneous every 24 hours  levothyroxine 75 MICROGram(s) Oral daily  lidocaine   4% Patch 2 Patch Transdermal daily  magnesium citrate Oral Solution 1 Bottle Oral once  polyethylene glycol 3350 17 Gram(s) Oral two times a day  senna 2 Tablet(s) Oral at bedtime  triamterene 37.5 mG/hydrochlorothiazide 25 mG Tablet 1 Tablet(s) Oral daily    MEDICATIONS  (PRN):  naproxen 250 milliGRAM(s) Oral every 8 hours PRN Severe Pain (7 - 10)  oxyCODONE    IR 2.5 milliGRAM(s) Oral every 6 hours PRN Severe Pain (7 - 10)      PHYSICAL EXAM:  GENERAL: thin appearing, NAD  EYES: clear conjunctiva;  ENMT: Moist mucous membranes  NECK: Supple, No JVD, Normal thyroid  CHEST/LUNG: Clear to auscultation bilaterally; No rales, rhonchi, wheezing, or rubs  HEART: S1, S2, Regular rate and rhythm  ABDOMEN: Soft, Nontender, Nondistended; Bowel sounds present  NEURO: Alert & Oriented X3  EXTREMITIES: No LE edema, no calf tenderness  LYMPH: No lymphadenopathy noted  SKIN: No rashes or lesions    Consultant(s) Notes Reviewed:  [x ] YES  [ ] NO  Care Discussed with Consultants/Other Providers [ x] YES  [ ] NO    LABS:            CAPILLARY BLOOD GLUCOSE                RADIOLOGY & ADDITIONAL TESTS:  < from: CT Lumbar Spine No Cont (05.11.22 @ 15:06) >    ACC: [id number] EXAM:  CT LUMBAR SPINE                          PROCEDURE DATE:  05/11/2022          INTERPRETATION:  Noncontrast CT examination of the lumbar spine    CLINICAL INDICATION: Back pain    TECHNIQUE:  Direct axial CT scanning of the lumbar spine was obtained   without the administration of intravenous contrast.  Sagittal and coronal   reformats were provided. Three-dimensional reconstructions of the liver   spine were created by the radiology technologist.    COMPARISON: None available    FINDINGS:    No acute fracture or traumatic subluxation. Vertebral body height and   facet alignment are maintained.    Grade 1 retrolisthesis of L2 on L3. Multilevel disc space narrowing.   Lumbar lordosis is maintained.    T12-L1: No spinal canal stenosis or neural foraminal narrowing.    L1-L2:  No spinal canal stenosis or neural foraminal narrowing.    L2-L3: Broad-based disc protrusion asymmetric to the right. Mild spinal   canal stenosis. Moderate right neural foraminal narrowing.    L3-L4: Disc bulge and bilateral facet/ligamentous hypertrophy. Mild   spinal canal stenosis. Mild bilateral neural foraminal narrowing.    L4-L5: Disc bulge and bilateral facet/ligamentous hypertrophy. Mild   spinal canal stenosis. Mild bilateral neural foraminal narrowing.    L5-S1: Disc bulge and bilateral facet hypertrophy. Mild spinal canal   stenosis. Moderate left and mild right neural foraminal narrowing.      IMPRESSION:    No acute fracture or traumatic subluxation.  Multilevel degenerative changes    --- End of Report ---            PAWAN GUZMAN MD; Attending Radiologist  This document has been electronically signed. May 11 2022  3:19PM    < end of copied text >      Imaging Personally Reviewed:  [ ] YES  [ ] NO   bed rails

## 2022-11-10 NOTE — PROGRESS NOTE ADULT - ASSESSMENT
None  80 y/o female osteoporosis, HTN, HLD, now w lbp for 1 days which is mod to severe, was having trouble to walk. no trauma. Admitted for ambulatory dysfunction. CT LS resulted no fx, degenerative changes. pain management and PT consulted.   80 y/o female osteoporosis, HTN, HLD, now w lbp for 1 days which is mod to severe, was having trouble to walk. no trauma. Admitted for ambulatory dysfunction. CT LS resulted no fx, degenerative changes. pain management and PT consulted.   5/13- No acute complaints at time of eval. PT recs MICHAEL.  Labs notable for elevated TSH 6.02.  Synthroid increased to 75mcg.

## 2022-12-15 NOTE — DIETITIAN INITIAL EVALUATION ADULT. - 30 CAL FROM
Continue working on healthy eating and moving (start low and slow, work up to 30 min, 5x/week)    BG goals:  Fasting and before meals <130, >70  2 hour after eating <180    We only need 1/2 of these numbers to be within target then your A1c will be within target    Medication changes   None for now    Keep working adding the carb bolus BEFORE consuming carbs.     Follow up   3 months    Call me sooner if any problems/concerns and/or questions develop including consistent low BGs <70 or consistent high BGs >200  [phone] (Unit Coordinator)    [phone] (Nurse)    
[phone]
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
